# Patient Record
Sex: FEMALE | Race: WHITE | NOT HISPANIC OR LATINO | Employment: FULL TIME | URBAN - METROPOLITAN AREA
[De-identification: names, ages, dates, MRNs, and addresses within clinical notes are randomized per-mention and may not be internally consistent; named-entity substitution may affect disease eponyms.]

---

## 2020-02-04 ENCOUNTER — HOSPITAL ENCOUNTER (OUTPATIENT)
Dept: OTHER | Facility: HOSPITAL | Age: 20
Discharge: HOME | End: 2020-02-04
Attending: NURSE PRACTITIONER

## 2020-02-04 ENCOUNTER — OFFICE VISIT CONVERTED (OUTPATIENT)
Dept: FAMILY MEDICINE CLINIC | Age: 20
End: 2020-02-04
Attending: NURSE PRACTITIONER

## 2020-02-04 LAB
ALBUMIN SERPL-MCNC: 4.8 G/DL (ref 3.5–5)
ALBUMIN/GLOB SERPL: 1.8 {RATIO} (ref 1.4–2.6)
ALP SERPL-CCNC: 85 U/L (ref 42–98)
ALT SERPL-CCNC: 19 U/L (ref 10–40)
ANION GAP SERPL CALC-SCNC: 19 MMOL/L (ref 8–19)
AST SERPL-CCNC: 15 U/L (ref 15–50)
BILIRUB SERPL-MCNC: 0.62 MG/DL (ref 0.2–1.3)
BUN SERPL-MCNC: 12 MG/DL (ref 5–25)
BUN/CREAT SERPL: 16 {RATIO} (ref 6–20)
CALCIUM SERPL-MCNC: 9.8 MG/DL (ref 8.7–10.4)
CHLORIDE SERPL-SCNC: 104 MMOL/L (ref 99–111)
CONV CO2: 20 MMOL/L (ref 22–32)
CONV TOTAL PROTEIN: 7.4 G/DL (ref 6.3–8.2)
CREAT UR-MCNC: 0.74 MG/DL (ref 0.5–0.9)
GFR SERPLBLD BASED ON 1.73 SQ M-ARVRAT: >60 ML/MIN/{1.73_M2}
GLOBULIN UR ELPH-MCNC: 2.6 G/DL (ref 2–3.5)
GLUCOSE SERPL-MCNC: 92 MG/DL (ref 65–99)
OSMOLALITY SERPL CALC.SUM OF ELEC: 287 MOSM/KG (ref 273–304)
POTASSIUM SERPL-SCNC: 4 MMOL/L (ref 3.5–5.3)
SODIUM SERPL-SCNC: 139 MMOL/L (ref 135–147)
TSH SERPL-ACNC: 0.72 M[IU]/L (ref 0.27–4.2)

## 2020-02-07 LAB
CONV CELIAC DISEASE AB-IGA: 92 MG/DL (ref 68–408)
TTG IGA SER-ACNC: 0 U/ML (ref 0–3)

## 2020-03-04 ENCOUNTER — HOSPITAL ENCOUNTER (OUTPATIENT)
Dept: OTHER | Facility: HOSPITAL | Age: 20
Discharge: HOME | End: 2020-03-04
Attending: NURSE PRACTITIONER

## 2020-03-04 ENCOUNTER — OFFICE VISIT CONVERTED (OUTPATIENT)
Dept: FAMILY MEDICINE CLINIC | Age: 20
End: 2020-03-04
Attending: NURSE PRACTITIONER

## 2020-03-04 LAB
CHOLEST SERPL-MCNC: 215 MG/DL (ref 107–200)
CHOLEST/HDLC SERPL: 3.6 {RATIO} (ref 3–6)
HDLC SERPL-MCNC: 59 MG/DL (ref 40–60)
LDLC SERPL CALC-MCNC: 138 MG/DL (ref 70–100)
TRIGL SERPL-MCNC: 90 MG/DL (ref 40–150)
VLDLC SERPL-MCNC: 18 MG/DL (ref 5–37)

## 2020-09-30 ENCOUNTER — OFFICE VISIT CONVERTED (OUTPATIENT)
Dept: FAMILY MEDICINE CLINIC | Age: 20
End: 2020-09-30
Attending: NURSE PRACTITIONER

## 2021-04-14 ENCOUNTER — OFFICE VISIT CONVERTED (OUTPATIENT)
Dept: FAMILY MEDICINE CLINIC | Age: 21
End: 2021-04-14
Attending: NURSE PRACTITIONER

## 2021-05-18 NOTE — PROGRESS NOTES
Chon Lopez  2000     Office/Outpatient Visit    Visit Date: Wed, Mar 4, 2020 02:03 pm    Provider: Kathy Topete N.P. (Assistant: Homa Mccormack MA)    Location: [facility]        Electronically signed by Kathy Topete N.P. on  03/05/2020 12:46:02 PM                             Subjective:        CC: Ms. Lopez is a 19 year old White female.  This is a follow-up visit.  and needs physical exam        HPI:           Patient presents with anxiety disorder, unspecified.  about 25 % improved on sertraline and sleeping better.  denies suicidal ideation or side effects of medication.  would like to continue sertraline and try an increased dose.            Additionally, she presents with history of encounter for general adult medical examination without abnormal findings.  she cannot recall when she last had a physical exam.  She is not currently using any form of contraception.   Her last Pap smear was 2 years ago and was normal.   She has never had a mammogram. She's had vision screening done 1 year ago and this was abnormal, but is corrected with glasses.   Preventative Health updated today.  She is not current with her Td and influenza immunization.  Ms. Lopez denies any history of abnormal Pap smears.      ROS:     CONSTITUTIONAL:  Negative for chills, fatigue, fever, and weight change.      CARDIOVASCULAR:  Negative for chest pain, palpitations, tachycardia, orthopnea, and edema.      RESPIRATORY:  Negative for cough, dyspnea, and hemoptysis.      GASTROINTESTINAL:  Positive for constipation ( improved ) and diarrhea ( improved on sertraline ).   Negative for abdominal pain, acid reflux symptoms, hematochezia, nausea or vomiting.      MUSCULOSKELETAL:  Negative for arthralgias, back pain, and myalgias.      NEUROLOGICAL:  Negative for dizziness, headaches, paresthesias, and weakness.      PSYCHIATRIC:  Positive for anxiety ( (improved) ) and insomnia; improved.   Negative for mood  swings or suicidal thoughts.          Past Medical History / Family History / Social History:         Last Reviewed on 2/04/2020 03:09 PM by Kathy Topete    Surgical History:         Positive for    wisdom tooth removal;;         Family History:         Positive for Hyperlipidemia ( mat. GF ).      Positive for Breast Cancer ( pat. GM ).      Positive for Type 2 Diabetes ( pat. GF ).      Positive for Anxiety ( mother ) and ADD mother and sister.          Social History:     Occupation:      Marital Status: Single     Children: None         Tobacco/Alcohol/Supplements:     Last Reviewed on 2/04/2020 03:04 PM by Homa Mccormack    Tobacco: She has a past history of cigarette smoking; quit date:  2018.          Current Problems:     Last Reviewed on 2/04/2020 03:09 PM by Kathy Topete    Anxiety disorder, unspecified    Diarrhea, unspecified    Encounter for screening for depression        Immunizations:     DTaP 1998    DTaP 1998    DTaP 1998    DTaP 9/9/1999    DTaP 6/7/2002    PedvaxHIB (Hib PRP-OMP) 1998    PedvaxHIB (Hib PRP-OMP) 1998    PedvaxHIB (Hib PRP-OMP) 1998    PedvaxHIB (Hib PRP-OMP) 9/9/1999    Hep B (pedi/adol, 3-dose schedule) 1998    Hep B (pedi/adol, 3-dose schedule) 1998    Hep B (pedi/adol, 3-dose schedule) 1998    IPV  Poliovirus, inactivated 1998    IPV  Poliovirus, inactivated 1998    IPV  Poliovirus, inactivated 6/15/1999    IPV  Poliovirus, inactivated 6/7/2002    MMR  (Measles-Mumps-Rubella), live 6/15/1999    MMR  (Measles-Mumps-Rubella), live 6/7/2002    Varicella, live 12/8/1999    Prevnar (Pneumococcal PCV 7) 6/19/2000        Allergies:     Last Reviewed on 3/04/2020 02:03 PM by Homa Mccormack    hydrocodone:          Current Medications:     Last Reviewed on 3/04/2020 02:03 PM by Homa Mccormack    No Known Medications.    sertraline 50 mg oral tablet [take 1/2 tablet daily at bedtime x 1 week then increase to 1 tab daily  at bedtime]        Objective:        Vitals:         Historical:     2/4/2020  BP:   130/76 mm Hg ( (right arm, , sitting, );) 2/4/2020  Wt:   141.4lbs    Current: 3/4/2020 2:07:39 PM    Ht:  5 ft, 1 in;  Wt: 137 lbs;  BMI: 25.9T: 99.9 F (oral);  BP: 135/90 mm Hg (right arm, sitting);  P: 94 bpm (right arm (BP Cuff), sitting);  sCr: 0.74 mg/dL;  GFR: 109.91        Repeat:     2:8:18 PM  BP:   122/77mm Hg (right arm, sitting, P-87)     Exams:     PHYSICAL EXAM:     GENERAL:  well developed and nourished; appropriately groomed; in no apparent distress;     EYES: PERRL, EOMI     E/N/T: EARS: bilateral TMs are normal;  NOSE: normal nasal mucosa; OROPHARYNX: posterior pharynx, including tonsils, tongue, and uvula are normal;     NECK: thyroid is non-palpable;     RESPIRATORY: normal respiratory rate and pattern with no distress; normal breath sounds with no rales, rhonchi, wheezes or rubs;     CARDIOVASCULAR: normal rate; rhythm is regular;     GASTROINTESTINAL: nontender; normal bowel sounds; no organomegaly;     LYMPHATIC: no enlargement of cervical or facial nodes;     MUSCULOSKELETAL:  Normal range of motion, strength and tone;     NEUROLOGIC: mental status: alert and oriented x 3; GROSSLY INTACT     PSYCHIATRIC:  appropriate affect and demeanor; normal speech pattern; grossly normal memory;         Assessment:         F41.9   Anxiety disorder, unspecified       Z13.220   Encounter for screening for lipoid disorders       Z00.00   Encounter for general adult medical examination without abnormal findings           ORDERS:         Meds Prescribed:       [Refilled] sertraline 50 mg oral tablet [take 1 and 1/2 tablet daily at bedtime], #45 (forty five) tablets, Refills: 5 (five)         Lab Orders:       [id number]  [facility] Lipid Panel  (Send-Out)                      Plan:         Anxiety disorder, unspecified        RECOMMENDATIONS given include: avoidance of caffeine, stress reduction, and advise counseling.  increase  dose.  follow up if not improving..            Prescriptions:       [Refilled] sertraline 50 mg oral tablet [take 1 and 1/2 tablet daily at bedtime], #45 (forty five) tablets, Refills: 5 (five)         Encounter for screening for lipoid disorders    LABORATORY:  Labs ordered to be performed today include lipid panel.            Orders:       [id number]  [facility] Lipid Panel  (Send-Out)              Encounter for general adult medical examination without abnormal findings        COUNSELING was provided today regarding the following topics: healthy eating habits, regular exercise, breast self-exam, contraception, and STD prevention.            Patient Education Handouts:       Physical Exam 20-29 year, Female              Patient Recommendations:        For  Anxiety disorder, unspecified:    Try to avoid or reduce the amount of caffeine intake. Try stress reduction methods to reduce the frequency or lessen the severity of anxiety episodes.          For  Encounter for general adult medical examination without abnormal findings:        Limit dietary intake of fat (especially saturated fat) and cholesterol.  Eat a variety of foods, including plenty of fruits, vegetables, and grain containg fiber, limit fat intake to 30% of total calories. Balance caloric intake with energy expended.    Maintaining regular physical activity is advised to help prevent heart disease, hypertension, diabetes, and obesity.    You should regularly examine your breasts, easily done while in the shower or with lotion.  Feel and look for differences in consistency from month to month, especially noting knots or lumps, changes in skin appearance, nipple retraction or discharge.    Sexually transmitted diseases may be prevented by abstaining from sexual activity or avoiding sexual contact with high risk partners, and consistently using a condom or female barrier contraceptives plus spermacide.              Charge Capture:         Primary Diagnosis:      F41.9  Anxiety disorder, unspecified     Z13.220  Encounter for screening for lipoid disorders     Z00.00  Encounter for general adult medical examination without abnormal findings           Orders:      [id number]  Preventive medicine, established patient, age 18-39 years  (In-House)

## 2021-05-18 NOTE — PROGRESS NOTES
Chon Lopez  2000     Office/Outpatient Visit    Visit Date: Wed, Sep 30, 2020 12:43 pm    Provider: Kathy Topete N.P. (Assistant: Crystal Hilario, RN)    Location: [facility]        Electronically signed by Kathy Topete N.P. on  10/01/2020 08:50:51 PM                             Subjective:        CC: Ms. Lopez is a 20 year old White female.  medication refills;         HPI:           PHQ-9 Depression Screening: Completed form scanned and in chart; Total Score 17           Additionally, she presents with history of anxiety disorder, unspecified.  only about 25 % improved on sertraline.  has not scheduled cousneling yet.  good support system with sister and friend in [place].  strained relationship with parents.  likes new job ([employer]) and denies diarrhea or other stomach issues currently .  lmp 3 wks ago.  not sexually active.  has never had pap testing.      ROS:     CONSTITUTIONAL:  Negative for chills, fatigue, fever, and weight change.      CARDIOVASCULAR:  Negative for chest pain, palpitations, tachycardia, orthopnea, and edema.      RESPIRATORY:  Negative for cough, dyspnea, and hemoptysis.      GASTROINTESTINAL:  Negative for abdominal pain, heartburn, constipation, diarrhea, and stool changes.      MUSCULOSKELETAL:  Negative for arthralgias, back pain, and myalgias.      NEUROLOGICAL:  Negative for dizziness, headaches, paresthesias, and weakness.      PSYCHIATRIC:  Positive for anxiety and feelings of stress.   Negative for depression, mood swings, sleep disturbance or suicidal thoughts.          Past Medical History / Family History / Social History:         Last Reviewed on 9/30/2020 01:04 PM by Kathy Topete    Surgical History:         Positive for    wisdom tooth removal;;         Family History:         Positive for Hyperlipidemia ( mat. GF ).      Positive for Breast Cancer ( pat. GM ).      Positive for Type 2 Diabetes ( pat. GF ).      Positive for  Anxiety ( mother ) and ADD mother and sister.          Social History:     Occupation: [employer]     Marital Status: Single     Children: None         Tobacco/Alcohol/Supplements:     Last Reviewed on 9/30/2020 12:46 PM by Crystal Hilario    Tobacco: She has a past history of cigarette smoking; quit date:  2018.          Current Problems:     Last Reviewed on 2/04/2020 03:09 PM by Kathy Topete    Anxiety disorder, unspecified    Diarrhea, unspecified    Encounter for screening for depression    Encounter for general adult medical examination without abnormal findings    Encounter for screening for lipoid disorders        Immunizations:     DTaP 1998    DTaP 1998    DTaP 1998    DTaP 9/9/1999    DTaP 6/7/2002    PedvaxHIB (Hib PRP-OMP) 1998    PedvaxHIB (Hib PRP-OMP) 1998    PedvaxHIB (Hib PRP-OMP) 1998    PedvaxHIB (Hib PRP-OMP) 9/9/1999    Hep B (pedi/adol, 3-dose schedule) 1998    Hep B (pedi/adol, 3-dose schedule) 1998    Hep B (pedi/adol, 3-dose schedule) 1998    IPV  Poliovirus, inactivated 1998    IPV  Poliovirus, inactivated 1998    IPV  Poliovirus, inactivated 6/15/1999    IPV  Poliovirus, inactivated 6/7/2002    MMR  (Measles-Mumps-Rubella), live 6/15/1999    MMR  (Measles-Mumps-Rubella), live 6/7/2002    Varicella, live 12/8/1999    Prevnar (Pneumococcal PCV 7) 6/19/2000        Allergies:     Last Reviewed on 3/04/2020 02:03 PM by Homa Mccormack    hydrocodone:          Current Medications:     Last Reviewed on 9/30/2020 12:46 PM by Crystal Hilario    No Known Medications.    sertraline 50 mg oral tablet [TAKE ONE AND ONE-HALF (1 & 1/2) TABLET BY MOUTH EVERY NIGHT AT BEDTIME]        Objective:        Vitals:         Historical:     3/4/2020  BP:   122/77 mm Hg ( (right arm, , sitting, );) 3/4/2020  Wt:   137lbs    Current: 9/30/2020 12:48:57 PM    Ht:  5 ft, 1 in;  Wt: 135.2 lbs;  BMI: 25.5T: 97.9 F (temporal);  BP: 123/78 mm Hg (right arm, sitting);   P: 61 bpm (right arm (BP Cuff), sitting);  sCr: 0.74 mg/dL;  GFR: 108.40        Exams:     PHYSICAL EXAM:     GENERAL: anxious;     NECK: thyroid is non-palpable;     RESPIRATORY: normal respiratory rate and pattern with no distress; normal breath sounds with no rales, rhonchi, wheezes or rubs;     CARDIOVASCULAR: normal rate; rhythm is regular;  no edema;     MUSCULOSKELETAL:  Normal range of motion, strength and tone;     NEUROLOGIC: mental status: alert and oriented x 3; GROSSLY INTACT     PSYCHIATRIC:  appropriate affect and demeanor; normal speech pattern; grossly normal memory;         Assessment:         Z13.31   Encounter for screening for depression       F41.9   Anxiety disorder, unspecified           ORDERS:         Meds Prescribed:       [New Rx] escitalopram oxalate 10 mg oral tablet [take 1 tablet (10 mg) by oral route once daily], #30 (thirty) tablets, Refills: 5 (five)       [New Rx] busPIRone 5 mg oral tablet [take 1 tablet (5 mg) by oral route 2 times per day], #60 (sixty) tablets, Refills: 1 (one)         Other Orders:         Depression screen positive and follow up plan documented  (In-House)                      Plan:         Encounter for screening for depression    MIPS PHQ-9 Depression Screening: Completed form scanned and in chart; Total Score 17 Positive Depression Screen: Suicide Risk Assessment completed--denies suicidal/homicidal ideation; Pharmacologic intervention initiated/modified advise pap testing to screen for cervical cancer          Orders:         Depression screen positive and follow up plan documented  (In-House)              Anxiety disorder, unspecifiedstop sertraline ,  change to excitalopram. lj        RECOMMENDATIONS given include: avoidance of caffeine, stress reduction, and advise counseling.  list of local counselors provided..      denies symptoms of bipolar disorder.            Prescriptions:       [New Rx] escitalopram oxalate 10 mg oral tablet [take 1  tablet (10 mg) by oral route once daily], #30 (thirty) tablets, Refills: 5 (five)       [New Rx] busPIRone 5 mg oral tablet [take 1 tablet (5 mg) by oral route 2 times per day], #60 (sixty) tablets, Refills: 1 (one)           Patient Education Handouts:       Mental Health and Substance Abuse Services in [facility]              Patient Recommendations:        For  Anxiety disorder, unspecified:    Try to avoid or reduce the amount of caffeine intake. Try stress reduction methods to reduce the frequency or lessen the severity of anxiety episodes.              Charge Capture:         Primary Diagnosis:     Z13.31  Encounter for screening for depression           Orders:      [id number]  Office/outpatient visit; established patient, level 3  (In-House)              Depression screen positive and follow up plan documented  (In-House)              F41.9  Anxiety disorder, unspecified

## 2021-05-18 NOTE — PROGRESS NOTES
"Chon Lopez  2000     Office/Outpatient Visit    Visit Date: Tue, Feb 4, 2020 03:01 pm    Provider: Kathy Topete N.P. (Assistant: Homa Mccormack MA)    Location: [facility]        Electronically signed by Kathy Topete N.P. on  02/05/2020 03:56:12 PM                             Subjective:        CC: Ms. Lopez is a 19 year old female.  This is her first visit to the clinic.  establish care, \"every time i eat i have to go to the bathroom\";         HPI: lmp 3 wks ago          PHQ-9 Depression Screening: Completed form scanned and in chart; Total Score 20           Diarrhea, unspecified details; this has been present for the last year.  She estimates the stool frequency at 3 to 4 per day.  Stool volume is essentially unchanged from normal.  Stool is described as loose.  She denies associated symptoms, including fever, nausea, sensation of incomplete evacuation and vomiting.  Ms. Lopez denies recent travel outside of the country.  There has been no recent ingestion of antibiotics, shellfish, or undercooked hamburger.  She denies exposure to ill contacts.            In regard to the anxiety disorder, unspecified, she has suggestive symptoms but does not currently carry an official diagnosis of anxiety disorder.  Her symptom complex includes apprehension, insomnia, and depression.  True panic attacks apparently do not occur.  The frequency symptoms is several times per week.  Apparent triggers include disagreements with parents about career path.  She is not currently being treated for anxiety.  She has had no prior treatment for anxiety.  Medical history is pertinent for ADD.  Family history is pertinent for anxiety and ADD.      ROS:     CONSTITUTIONAL:  Positive for fatigue.   Negative for fever.      EYES:  Positive for use of glasses.      CARDIOVASCULAR:  Negative for chest pain, palpitations, tachycardia, orthopnea, and edema.      RESPIRATORY:  Negative for cough, dyspnea, and " hemoptysis.      GASTROINTESTINAL:  Positive for diarrhea.   Negative for abdominal pain, constipation, nausea or vomiting.      MUSCULOSKELETAL:  Negative for arthralgias, back pain, and myalgias.      NEUROLOGICAL:  Negative for dizziness, headaches, paresthesias, and weakness.      ENDOCRINE:  Negative for hair loss, heat/cold intolerance, polydipsia, and polyphagia.      PSYCHIATRIC:  Positive for anxiety, depression, feelings of stress, mood swings and insomnia.   Negative for crying spells, anhedonia, personality change, recreational drug use or suicidal thoughts.          Past Medical History / Family History / Social History:         Last Reviewed on 2/04/2020 03:09 PM by Kathy Topete    Surgical History:         Positive for    wisdom tooth removal;;         Family History:         Positive for Hyperlipidemia ( mat. GF ).      Positive for Breast Cancer ( pat. GM ).      Positive for Type 2 Diabetes ( pat. GF ).      Positive for Anxiety ( mother ) and ADD mother and sister.          Social History:     Occupation:      Marital Status: Single     Children: None         Tobacco/Alcohol/Supplements:     Last Reviewed on 2/04/2020 03:04 PM by Homa Mccormack    Tobacco: She has a past history of cigarette smoking; quit date:  2018.          Current Problems:     None Recorded        Immunizations:     DTaP 1998    DTaP 1998    DTaP 1998    DTaP 9/9/1999    DTaP 6/7/2002    PedvaxHIB (Hib PRP-OMP) 1998    PedvaxHIB (Hib PRP-OMP) 1998    PedvaxHIB (Hib PRP-OMP) 1998    PedvaxHIB (Hib PRP-OMP) 9/9/1999    Hep B (pedi/adol, 3-dose schedule) 1998    Hep B (pedi/adol, 3-dose schedule) 1998    Hep B (pedi/adol, 3-dose schedule) 1998    IPV  Poliovirus, inactivated 1998    IPV  Poliovirus, inactivated 1998    IPV  Poliovirus, inactivated 6/15/1999    IPV  Poliovirus, inactivated 6/7/2002    MMR  (Measles-Mumps-Rubella), live 6/15/1999    MMR   (Measles-Mumps-Rubella), live 6/7/2002    Varicella, live 12/8/1999    Prevnar (Pneumococcal PCV 7) 6/19/2000        Allergies:     No Known Allergies.        Current Medications:     Last Reviewed on 2/04/2020 03:04 PM by Homa Mccormack    No Known Medications.        Objective:        Vitals:         Current: 2/4/2020 3:06:49 PM    Ht:  5 ft, 1 in (Estimated);  Wt: 141.4 lbs;  BMI: 26.7T: 100 F (oral);  BP: 151/91 mm Hg (right arm, sitting);  P: 79 bpm (right arm (BP Cuff), sitting)        Repeat:     3:7:31 PM  BP:   130/76mm Hg (right arm, sitting, P-)     Exams:     PHYSICAL EXAM:     GENERAL: anxious;     NECK: thyroid is non-palpable;     RESPIRATORY: normal respiratory rate and pattern with no distress; normal breath sounds with no rales, rhonchi, wheezes or rubs;     CARDIOVASCULAR: normal rate; rhythm is regular;     GASTROINTESTINAL: nontender; normal bowel sounds; no organomegaly;     MUSCULOSKELETAL:  Normal range of motion, strength and tone;     NEUROLOGIC: mental status: alert and oriented x 3; GROSSLY INTACT     PSYCHIATRIC: affect/demeanor: anxious;  normal psychomotor function; normal speech pattern; normal thought and perception;         Assessment:         Z13.31   Encounter for screening for depression       R19.7   Diarrhea, unspecified       F41.9   Anxiety disorder, unspecified           ORDERS:         Meds Prescribed:       [New Rx] sertraline 50 mg oral tablet [take 1/2 tablet daily at bedtime x 1 week then increase to 1 tab daily at bedtime], #30 (thirty) tablets, Refills: 2 (two)         Lab Orders:       [id number]  CELID - [facility] - Celiac Antibodies Panel  (Send-Out)            [id number]  COMP - H Comp. Metabolic Panel  (Send-Out)            [id number]  TSH - [facility] TSH  (Send-Out)              Other Orders:         Depression screen positive and follow up plan documented  (In-House)                      Plan:         Encounter for screening for depression    MIPS PHQ-9 Depression Screening:  Completed form scanned and in chart; Total Score 20 Positive Depression Screen: Suicide Risk Assessment completed--denies suicidal/homicidal ideation; Pharmacologic intervention initiated/modified           Orders:         Depression screen positive and follow up plan documented  (In-House)              Diarrhea, unspecified    LABORATORY:  Labs ordered to be performed today include Celiac Antibody Panel, Comprehensive metabolic panel, and TSH.            Orders:       [id number]  [facility] - [facility] - Celiac Antibodies Panel  (Send-Out)            [id number]  COMP - [facility] Comp. Metabolic Panel  (Send-Out)            [id number]  TSH - [facility] TSH  (Send-Out)                Patient Education Handouts:       Irritable Bowel Syndrome (IBS)          Anxiety disorder, unspecified        RECOMMENDATIONS given include: avoidance of caffeine, stress reduction, and discuss trial of buspirone or sertraline.  she does feel that she has depression in addition to anxiety so will start with SSRI.  to Er if worsens.  follow up in one month or sooner if concerns..            Prescriptions:       [New Rx] sertraline 50 mg oral tablet [take 1/2 tablet daily at bedtime x 1 week then increase to 1 tab daily at bedtime], #30 (thirty) tablets, Refills: 2 (two)           Patient Education Handouts:       Bipolar Disorder (Manic Depression)        Generalized Anxiety Disorder        Mental Health and Substance Abuse Services in [facility]        Panic Attack              Patient Recommendations:        For  Anxiety disorder, unspecified:    Try to avoid or reduce the amount of caffeine intake. Try stress reduction methods to reduce the frequency or lessen the severity of anxiety episodes.              Charge Capture:         Primary Diagnosis:     Z13.31  Encounter for screening for depression           Orders:      [id number]  Office visit - new pt, level 3  (In-House)              Depression screen positive and follow up plan documented  (In-House)               R19.7  Diarrhea, unspecified     F41.9  Anxiety disorder, unspecified

## 2021-05-18 NOTE — PROGRESS NOTES
Chon Lopez  2000     Office/Outpatient Visit    Visit Date: Wed, Apr 14, 2021 02:46 pm    Provider: Kathy Topete N.P. (Assistant: Katarzyna Kothari, MA)    Location: [facility]        Electronically signed by Kathy Topete N.P. on  04/15/2021 08:24:20 PM                             Subjective:        CC: Ms. Lopez is a 20 year old White female.  Patient presents today for follow up visit;         HPI:           Patient to be evaluated for anxiety disorder, unspecified.  improved on escitalopram and buspirone.  denies side effects.  requests refills.  lmp 2 wks ago.  has not scheduled counseling yet.      ROS:     CONSTITUTIONAL:  Negative for chills, fatigue, fever, and weight change.      CARDIOVASCULAR:  Negative for chest pain, palpitations, tachycardia, orthopnea, and edema.      RESPIRATORY:  Negative for cough, dyspnea, and hemoptysis.      GENITOURINARY:  Negative for genital lesions, hematuria, menstrual problems, polyuria, abnormal vaginal bleeding, and vaginal discharge.      MUSCULOSKELETAL:  Negative for arthralgias, back pain, and myalgias.      NEUROLOGICAL:  Negative for dizziness, headaches, paresthesias, and weakness.      PSYCHIATRIC:  Positive for anxiety ( (improved) ).   Negative for mood swings, sleep disturbance or suicidal thoughts.          Past Medical History / Family History / Social History:         Last Reviewed on 4/14/2021 02:54 PM by Kathy Topete    Surgical History:         Positive for    wisdom tooth removal;;         Family History:         Positive for Hyperlipidemia ( mat. GF ).      Positive for Breast Cancer ( pat. GM ).      Positive for Type 2 Diabetes ( pat. GF ).      Positive for Anxiety ( mother ) and ADD mother and sister.          Social History:     Occupation: [employer]     Marital Status: Single     Children: None         Tobacco/Alcohol/Supplements:     Last Reviewed on 4/14/2021 02:49 PM by Katarzyna Kothari    Tobacco:  She has a past history of cigarette smoking; quit date:  2018.          Current Problems:     Last Reviewed on 4/15/2021 08:17 PM by Kathy Topete    Anxiety disorder, unspecified    Encounter for screening for depression        Immunizations:     DTaP 1998    DTaP 1998    DTaP 1998    DTaP 9/9/1999    DTaP 6/7/2002    PedvaxHIB (Hib PRP-OMP) 1998    PedvaxHIB (Hib PRP-OMP) 1998    PedvaxHIB (Hib PRP-OMP) 1998    PedvaxHIB (Hib PRP-OMP) 9/9/1999    Hep B (pedi/adol, 3-dose schedule) 1998    Hep B (pedi/adol, 3-dose schedule) 1998    Hep B (pedi/adol, 3-dose schedule) 1998    IPV  Poliovirus, inactivated 1998    IPV  Poliovirus, inactivated 1998    IPV  Poliovirus, inactivated 6/15/1999    IPV  Poliovirus, inactivated 6/7/2002    MMR  (Measles-Mumps-Rubella), live 6/15/1999    MMR  (Measles-Mumps-Rubella), live 6/7/2002    Varicella, live 12/8/1999    Prevnar (Pneumococcal PCV 7) 6/19/2000        Allergies:     Last Reviewed on 4/14/2021 02:49 PM by Katarzyna Kothari    hydrocodone:          Current Medications:     Last Reviewed on 4/14/2021 02:49 PM by Katarzyna Kothari    No Known Medications.    escitalopram oxalate 10 mg oral tablet [take 1 tablet (10 mg) by oral route once daily]    busPIRone 5 mg oral tablet [TAKE ONE TABLET BY MOUTH TWICE A DAY]        Objective:        Vitals:         Current: 4/14/2021 2:50:34 PM    Ht:  5 ft, 1 in;  Wt: 132.4 lbs;  BMI: 25.0T: 97.3 F (temporal);  BP: 117/70 mm Hg (right arm, sitting);  P: 62 bpm (right arm (BP Cuff), sitting);  sCr: 0.74 mg/dL;  GFR: 107.44        Exams:     PHYSICAL EXAM:     GENERAL:  well developed and nourished; appropriately groomed; in no apparent distress;     NECK: thyroid is non-palpable;     RESPIRATORY: normal respiratory rate and pattern with no distress; normal breath sounds with no rales, rhonchi, wheezes or rubs;     CARDIOVASCULAR: normal rate; rhythm is regular;     MUSCULOSKELETAL:   Normal range of motion, strength and tone;     NEUROLOGIC: mental status: alert and oriented x 3; GROSSLY INTACT     PSYCHIATRIC:  appropriate affect and demeanor; normal speech pattern; grossly normal memory;         Assessment:         F41.9   Anxiety disorder, unspecified           ORDERS:         Meds Prescribed:       [Refilled] escitalopram oxalate 10 mg oral tablet [take 1 tablet (10 mg) by oral route once daily], #90 (ninety) tablets, Refills: 1 (one)       [Refilled] busPIRone 5 mg oral tablet [TAKE ONE TABLET BY MOUTH TWICE A DAY prn], #60 (sixty) tablets, Refills: 5 (five)                 Plan:         Anxiety disorder, unspecified        RECOMMENDATIONS given include: avoidance of caffeine, stress reduction, and strongly encourage counseling ans it helps build coping skills .  follow up in 6 months or sooner if concerns..  MIPS Vaccines Flu and Pneumonia updated in Shot record           Prescriptions:       [Refilled] escitalopram oxalate 10 mg oral tablet [take 1 tablet (10 mg) by oral route once daily], #90 (ninety) tablets, Refills: 1 (one)       [Refilled] busPIRone 5 mg oral tablet [TAKE ONE TABLET BY MOUTH TWICE A DAY prn], #60 (sixty) tablets, Refills: 5 (five)             Patient Recommendations:        For  Anxiety disorder, unspecified:    Try to avoid or reduce the amount of caffeine intake. Try stress reduction methods to reduce the frequency or lessen the severity of anxiety episodes.              Charge Capture:         Primary Diagnosis:     F41.9  Anxiety disorder, unspecified           Orders:      49772  Office/outpatient visit; established patient, level 3  (In-House)

## 2021-07-02 VITALS
HEIGHT: 61 IN | BODY MASS INDEX: 26.7 KG/M2 | TEMPERATURE: 100 F | WEIGHT: 141.4 LBS | SYSTOLIC BLOOD PRESSURE: 130 MMHG | DIASTOLIC BLOOD PRESSURE: 76 MMHG | HEART RATE: 79 BPM

## 2021-07-02 VITALS
HEART RATE: 94 BPM | HEIGHT: 61 IN | BODY MASS INDEX: 25.86 KG/M2 | DIASTOLIC BLOOD PRESSURE: 77 MMHG | SYSTOLIC BLOOD PRESSURE: 122 MMHG | WEIGHT: 137 LBS | TEMPERATURE: 99.9 F

## 2021-07-02 VITALS
BODY MASS INDEX: 25.53 KG/M2 | HEART RATE: 61 BPM | TEMPERATURE: 97.9 F | HEIGHT: 61 IN | WEIGHT: 135.2 LBS | DIASTOLIC BLOOD PRESSURE: 78 MMHG | SYSTOLIC BLOOD PRESSURE: 123 MMHG

## 2021-07-02 VITALS
HEIGHT: 61 IN | DIASTOLIC BLOOD PRESSURE: 70 MMHG | HEART RATE: 62 BPM | BODY MASS INDEX: 25 KG/M2 | TEMPERATURE: 97.3 F | SYSTOLIC BLOOD PRESSURE: 117 MMHG | WEIGHT: 132.4 LBS

## 2021-12-17 RX ORDER — ESCITALOPRAM OXALATE 10 MG/1
TABLET ORAL
Qty: 30 TABLET | Refills: 0 | Status: SHIPPED | OUTPATIENT
Start: 2021-12-17 | End: 2021-12-21 | Stop reason: SDUPTHER

## 2021-12-21 ENCOUNTER — OFFICE VISIT (OUTPATIENT)
Dept: FAMILY MEDICINE CLINIC | Age: 21
End: 2021-12-21

## 2021-12-21 VITALS
HEIGHT: 61 IN | OXYGEN SATURATION: 98 % | DIASTOLIC BLOOD PRESSURE: 74 MMHG | SYSTOLIC BLOOD PRESSURE: 114 MMHG | HEART RATE: 70 BPM | WEIGHT: 136 LBS | BODY MASS INDEX: 25.68 KG/M2

## 2021-12-21 DIAGNOSIS — F41.9 ANXIETY: Primary | ICD-10-CM

## 2021-12-21 PROCEDURE — 99213 OFFICE O/P EST LOW 20 MIN: CPT | Performed by: NURSE PRACTITIONER

## 2021-12-21 RX ORDER — BUSPIRONE HYDROCHLORIDE 5 MG/1
5 TABLET ORAL 2 TIMES DAILY PRN
Qty: 60 TABLET | Refills: 2 | Status: SHIPPED | OUTPATIENT
Start: 2021-12-21 | End: 2021-12-28

## 2021-12-21 RX ORDER — BUSPIRONE HYDROCHLORIDE 5 MG/1
TABLET ORAL AS NEEDED
COMMUNITY
Start: 2021-11-02 | End: 2021-12-21 | Stop reason: SDUPTHER

## 2021-12-21 RX ORDER — ESCITALOPRAM OXALATE 10 MG/1
10 TABLET ORAL DAILY
Qty: 90 TABLET | Refills: 1 | Status: SHIPPED | OUTPATIENT
Start: 2021-12-21 | End: 2021-12-28 | Stop reason: DRUGHIGH

## 2021-12-21 NOTE — PROGRESS NOTES
"Chief Complaint  Anxiety (Med refill)    Subjective  Patient is a 21-year-old female here today for follow-up on anxiety.  Improved on Lexapro 10 mg once per day.  Has taken buspirone a couple of times for breakthrough anxiety with some relief.  Persists with concerns of decreased concentration and possible hyperactivity.  Does consume multiple servings of caffeine per day.  Reports not feeling tired at night and goes to bed late or feeling unable to turn her mind off at night to sleep.  Last menstrual cycle was approximately 3 weeks ago.  Mother and sister have any diagnosed with some form of ADD.  Previous lipid panel obtained had mild elevations but patient states she was nonfasting.  She states she will let me know when she wants to have her fasting lipid panel rechecked.        Chon Lopez presents to [facility]    Review of Systems   Constitutional: Negative.    Respiratory: Negative.    Cardiovascular: Negative.    Neurological: Negative.    Psychiatric/Behavioral:        Anxiety improved but persists with decreased concentration        Objective   Vital Signs:   Vitals:    12/21/21 0944   BP: 114/74   BP Location: Right arm   Patient Position: Sitting   Cuff Size: Adult   Pulse: 70   SpO2: 98%   Weight: 61.7 kg (136 lb)   Height: 154.9 cm (61\")      Physical Exam  Vitals reviewed.   Constitutional:       General: She is not in acute distress.     Appearance: Normal appearance. She is well-developed.   Cardiovascular:      Rate and Rhythm: Normal rate and regular rhythm.      Heart sounds: Normal heart sounds.   Pulmonary:      Effort: Pulmonary effort is normal.      Breath sounds: Normal breath sounds.   Musculoskeletal:      Right lower leg: No edema.      Left lower leg: No edema.   Skin:     General: Skin is warm and dry.   Neurological:      General: No focal deficit present.      Mental Status: She is alert.   Psychiatric:         Attention and Perception: " Attention normal.         Mood and Affect: Mood and affect normal.         Behavior: Behavior normal.          Result Review :                                Assessment and Plan    Diagnoses and all orders for this visit:    1. Anxiety (Primary)  Assessment & Plan:  Suggest ruling out ADD or ADHD.  I am not trying to make that determination.  Patient is agreeable to see a mental health specialist for evaluation.  Recommend decreasing intake of caffeine.    Orders:  -     Ambulatory Referral to Psychiatry  -     busPIRone (BUSPAR) 5 MG tablet; Take 1 tablet by mouth 2 (Two) Times a Day As Needed (anxiety).  Dispense: 60 tablet; Refill: 2  -     escitalopram (LEXAPRO) 10 MG tablet; Take 1 tablet by mouth Daily.  Dispense: 90 tablet; Refill: 1      Follow Up    Return in about 6 months (around 6/21/2022).  Patient was given instructions and counseling regarding her condition or for health maintenance advice. Please see specific information pulled into the AVS if appropriate.

## 2021-12-21 NOTE — ASSESSMENT & PLAN NOTE
Suggest ruling out ADD or ADHD.  I am not trying to make that determination.  Patient is agreeable to see a mental health specialist for evaluation.  Recommend decreasing intake of caffeine.

## 2021-12-28 ENCOUNTER — OFFICE VISIT (OUTPATIENT)
Dept: BEHAVIORAL HEALTH | Facility: CLINIC | Age: 21
End: 2021-12-28

## 2021-12-28 VITALS
BODY MASS INDEX: 25.68 KG/M2 | SYSTOLIC BLOOD PRESSURE: 116 MMHG | HEIGHT: 61 IN | DIASTOLIC BLOOD PRESSURE: 66 MMHG | WEIGHT: 136 LBS

## 2021-12-28 DIAGNOSIS — F33.1 MODERATE EPISODE OF RECURRENT MAJOR DEPRESSIVE DISORDER (HCC): Primary | ICD-10-CM

## 2021-12-28 DIAGNOSIS — R41.840 ATTENTION AND CONCENTRATION DEFICIT: ICD-10-CM

## 2021-12-28 DIAGNOSIS — F41.1 GENERALIZED ANXIETY DISORDER: ICD-10-CM

## 2021-12-28 PROCEDURE — 90792 PSYCH DIAG EVAL W/MED SRVCS: CPT | Performed by: NURSE PRACTITIONER

## 2021-12-28 RX ORDER — BUSPIRONE HYDROCHLORIDE 5 MG/1
5 TABLET ORAL 2 TIMES DAILY
Qty: 60 TABLET | Refills: 2
Start: 2021-12-28 | End: 2022-06-21 | Stop reason: SDUPTHER

## 2021-12-28 RX ORDER — ESCITALOPRAM OXALATE 20 MG/1
20 TABLET ORAL DAILY
Qty: 30 TABLET | Refills: 5 | Status: SHIPPED | OUTPATIENT
Start: 2021-12-28 | End: 2022-06-21 | Stop reason: SDUPTHER

## 2021-12-28 NOTE — PATIENT INSTRUCTIONS
1.  Please return to clinic at your next scheduled visit.  Contact the clinic ([phone]) at least 24 hours prior in the event you need to cancel.  2.  Do no harm to yourself or others.    3.  Avoid alcohol and drugs.    4.  Take all medications as prescribed.  Please contact the clinic with any concerns. If you are in need of medication refills, please call the clinic at [phone].    5. Should you want to get in touch with your provider, ANIKA Goetz, please utilize [employer] or contact the office ([phone]), and staff will be able to page the provider on call directly.  6.  In the event you have personal crisis, contact the following crisis numbers: Suicide Prevention Hotline 1-373.248.5003; NAMRATA Helpline 0-042-443-AXFZ; [facility] Emergency Room [phone]; text HELLO to 594992; or 905.     SPECIFIC RECOMMENDATIONS:     1.      Medications discussed at this encounter:                   - Increase Lexapro from 10 to 20 mg by mouth nightly per patient preference.  Change Buspar from as needed to 5mg by mouth twice daily routinely.     2.      Psychotherapy recommendations: referral made for therapy and adhd testing. Patient to contact provider and set up appointment.  And to contact office if unable to get an appointment scheduled.      3.     Return to clinic: 4 weeks    Therapy:  Irina Loredo LM  Address: [address] [address] [address] [place] [address], [phone]  Phone: [phone]  Other location: [address] [facility], [facility] [address]   Services offered: Family, couples, and individual therapy for a wide variety of areas, such as mood disorders, personality disorders, psychosis, addiction, anxiety, coping skills, grief, trauma and PTSD, transgender, self-harming, suicidal ideation, and other. Multiple types of therapy offered, including dialectical, trauma focused, and more.  Insurance  accepted: [place], [place], BlueCross and BlueShield, CareSource, Humana, Medicaid, WellCare, Out of Network.       ADHD testing sites:    Dr. Duane Logan, Psychologist, MS, [facility]  [address], [address]  [place] [id number] [phone]   Currently only accepting referrals for psychological testing services.  Accepts Most insurance plans except Medicare Plans    Neuropsychological Services    [facility]  Address: [address] [id number]  Phone: [phone]  Other location: [id number] [place] [address]. [phone]  Website: [email or web address]ological[facility]ic[facility]  Services offered: Testing and assessments for adult ADHD, intelligence/adaptive functioning, dementia and cognitive impairment. Also provides counseling and psychotherapy for anxiety, depression, trauma, grief, and more.  Insurance accepted: Aetna, [place] [facility], [facility], [employer], [employer], [employer] Health, Humana, Medicare, , United Healthcare/Optum Health, ValueOptions, Medicaid plans (Aetna Better Health, [place] Medicaid, Humana Medicaid, [place], Passport, Wellcare).    [facility]   Address: [address], [place] [address]  Phone: [phone]  Website: [email or web address]  Services offered: Testing and assessments for adult ADHD, intelligence/adaptive functioning, brain injury, dementia and cognitive impairment. Also provides psychotherapy to individuals, couples, and families for anxiety, depression, adjustment, loss and grief, stress management, emotional distress, and more.  Insurance accepted: Most major insurance companies, including [place], Humana, and Medicare. Not currently participating in United Health Care and do not accept Medicaid.     [facility]  Address: [address], [place] [address]  Phone: [phone]  Website:  [email or web address]  Services offered: Evaluation and testing for adults with emotional difficulties and/or ADHD, functional impairment of the brain in adults/seniors, and more. Also provides individual, group, family and marital therapy, comprehensive hypnosis services, career counseling, and neurobehavioral family therapy for brain-impaired individuals and their families.  Insurance accepted: Humana, [place], United Health Care, [employer] ([employer]), and most commercial insurance. No Medicaid or Medicare.    Gisell Steele with [facility]  Address: [address], [place] [facility] (office is rear entrance on [address] [facility]).  Phone: [phone]  Website: [email or web address]  Services offered: Psychological testing offered and also psychotherapy.   Insurance accepted: Accepts most insurance, including Medicare and Medicaid.    1. [facility]      [phone]     2.  [facility]       Doctoral Students perform ADHD testing and use Sliding Scale payments.         [phone]    3.  [place] [facility]       [phone]    4.  Next [facility] ADHD       [phone]       Web-site includes request appointment portal if unable to reach via phone.  Escitalopram tablets  What is this medicine?  ESCITALOPRAM (es sye EMANUEL oh pram) is used to treat depression and certain types of anxiety.  This medicine may be used for other purposes; ask your health care provider or pharmacist if you have questions.  COMMON BRAND NAME(S): Lexapro  What should I tell my health care provider before I take this medicine?  They need to know if you have any of these conditions:  · bipolar disorder or a family history of bipolar disorder  · diabetes  · glaucoma  · heart disease  · kidney or liver disease  · receiving electroconvulsive therapy  · seizures (convulsions)  · suicidal thoughts, plans, or attempt by you or a family member  · an unusual or allergic reaction to  escitalopram, the related drug citalopram, other medicines, foods, dyes, or preservatives  · pregnant or trying to become pregnant  · breast-feeding  How should I use this medicine?  Take this medicine by mouth with a glass of water. Follow the directions on the prescription label. You can take it with or without food. If it upsets your stomach, take it with food. Take your medicine at regular intervals. Do not take it more often than directed. Do not stop taking this medicine suddenly except upon the advice of your doctor. Stopping this medicine too quickly may cause serious side effects or your condition may worsen.  A special MedGuide will be given to you by the pharmacist with each prescription and refill. Be sure to read this information carefully each time.  Talk to your pediatrician regarding the use of this medicine in children. Special care may be needed.  Overdosage: If you think you have taken too much of this medicine contact a poison control center or emergency room at once.  NOTE: This medicine is only for you. Do not share this medicine with others.  What if I miss a dose?  If you miss a dose, take it as soon as you can. If it is almost time for your next dose, take only that dose. Do not take double or extra doses.  What may interact with this medicine?  Do not take this medicine with any of the following medications:  · certain medicines for fungal infections like fluconazole, itraconazole, ketoconazole, posaconazole, voriconazole  · cisapride  · citalopram  · dronedarone  · linezolid  · MAOIs like Carbex, Eldepryl, Marplan, Nardil, and Parnate  · methylene blue (injected into a vein)  · pimozide  · thioridazine  This medicine may also interact with the following medications:  · alcohol  · amphetamines  · aspirin and aspirin-like medicines  · carbamazepine  · certain medicines for depression, anxiety, or psychotic disturbances  · certain medicines for migraine headache like almotriptan, eletriptan,  frovatriptan, naratriptan, rizatriptan, sumatriptan, zolmitriptan  · certain medicines for sleep  · certain medicines that treat or prevent blood clots like warfarin, enoxaparin, dalteparin  · cimetidine  · diuretics  · dofetilide  · fentanyl  · furazolidone  · isoniazid  · lithium  · metoprolol  · NSAIDs, medicines for pain and inflammation, like ibuprofen or naproxen  · other medicines that prolong the QT interval (cause an abnormal heart rhythm)  · procarbazine  · rasagiline  · supplements like Yue's wort, kava kava, valerian  · tramadol  · tryptophan  · ziprasidone  This list may not describe all possible interactions. Give your health care provider a list of all the medicines, herbs, non-prescription drugs, or dietary supplements you use. Also tell them if you smoke, drink alcohol, or use illegal drugs. Some items may interact with your medicine.  What should I watch for while using this medicine?  Tell your doctor if your symptoms do not get better or if they get worse. Visit your doctor or health care professional for regular checks on your progress. Because it may take several weeks to see the full effects of this medicine, it is important to continue your treatment as prescribed by your doctor.  Patients and their families should watch out for new or worsening thoughts of suicide or depression. Also watch out for sudden changes in feelings such as feeling anxious, agitated, panicky, irritable, hostile, aggressive, impulsive, severely restless, overly excited and hyperactive, or not being able to sleep. If this happens, especially at the beginning of treatment or after a change in dose, call your health care professional.  You may get drowsy or dizzy. Do not drive, use machinery, or do anything that needs mental alertness until you know how this medicine affects you. Do not stand or sit up quickly, especially if you are an older patient. This reduces the risk of dizzy or fainting spells. Alcohol may  interfere with the effect of this medicine. Avoid alcoholic drinks.  Your mouth may get dry. Chewing sugarless gum or sucking hard candy, and drinking plenty of water may help. Contact your doctor if the problem does not go away or is severe.  What side effects may I notice from receiving this medicine?  Side effects that you should report to your doctor or health care professional as soon as possible:  · allergic reactions like skin rash, itching or hives, swelling of the face, lips, or tongue  · anxious  · black, tarry stools  · changes in vision  · confusion  · elevated mood, decreased need for sleep, racing thoughts, impulsive behavior  · eye pain  · fast, irregular heartbeat  · feeling faint or lightheaded, falls  · feeling agitated, angry, or irritable  · hallucination, loss of contact with reality  · loss of balance or coordination  · loss of memory  · painful or prolonged erections  · restlessness, pacing, inability to keep still  · seizures  · stiff muscles  · suicidal thoughts or other mood changes  · trouble sleeping  · unusual bleeding or bruising  · unusually weak or tired  · vomiting  Side effects that usually do not require medical attention (report to your doctor or health care professional if they continue or are bothersome):  · changes in appetite  · change in sex drive or performance  · headache  · increased sweating  · indigestion, nausea  · tremors  This list may not describe all possible side effects. Call your doctor for medical advice about side effects. You may report side effects to FDA at 5-849-FDA-4815.  Where should I keep my medicine?  Keep out of reach of children.  Store at room temperature between 15 and 30 degrees C (59 and 86 degrees F). Throw away any unused medicine after the expiration date.  NOTE: This sheet is a summary. It may not cover all possible information. If you have questions about this medicine, talk to your doctor, pharmacist, or health care provider.  © 2021  Elsevier/Gold Standard (2019-12-09 11:21:44)  Buspirone tablets  What is this medicine?  BUSPIRONE (byghazala victoria) is used to treat anxiety disorders.  This medicine may be used for other purposes; ask your health care provider or pharmacist if you have questions.  COMMON BRAND NAME(S): Samy  What should I tell my health care provider before I take this medicine?  They need to know if you have any of these conditions:  · kidney or liver disease  · an unusual or allergic reaction to buspirone, other medicines, foods, dyes, or preservatives  · pregnant or trying to get pregnant  · breast-feeding  How should I use this medicine?  Take this medicine by mouth with a glass of water. Follow the directions on the prescription label. You may take this medicine with or without food. To ensure that this medicine always works the same way for you, you should take it either always with or always without food. Take your doses at regular intervals. Do not take your medicine more often than directed. Do not stop taking except on the advice of your doctor or health care professional.  Talk to your pediatrician regarding the use of this medicine in children. Special care may be needed.  Overdosage: If you think you have taken too much of this medicine contact a poison control center or emergency room at once.  NOTE: This medicine is only for you. Do not share this medicine with others.  What if I miss a dose?  If you miss a dose, take it as soon as you can. If it is almost time for your next dose, take only that dose. Do not take double or extra doses.  What may interact with this medicine?  Do not take this medicine with any of the following medications:  · linezolid  · MAOIs like Carbex, Eldepryl, Marplan, Nardil, and Parnate  · methylene blue  · procarbazine  This medicine may also interact with the following medications:  · diazepam  · digoxin  · diltiazem  · erythromycin  · grapefruit juice  · haloperidol  · medicines for  mental depression or mood problems  · medicines for seizures like carbamazepine, phenobarbital and phenytoin  · nefazodone  · other medications for anxiety  · rifampin  · ritonavir  · some antifungal medicines like itraconazole, ketoconazole, and voriconazole  · verapamil  · warfarin  This list may not describe all possible interactions. Give your health care provider a list of all the medicines, herbs, non-prescription drugs, or dietary supplements you use. Also tell them if you smoke, drink alcohol, or use illegal drugs. Some items may interact with your medicine.  What should I watch for while using this medicine?  Visit your doctor or health care professional for regular checks on your progress. It may take 1 to 2 weeks before your anxiety gets better.  You may get drowsy or dizzy. Do not drive, use machinery, or do anything that needs mental alertness until you know how this drug affects you. Do not stand or sit up quickly, especially if you are an older patient. This reduces the risk of dizzy or fainting spells. Alcohol can make you more drowsy and dizzy. Avoid alcoholic drinks.  What side effects may I notice from receiving this medicine?  Side effects that you should report to your doctor or health care professional as soon as possible:  · blurred vision or other vision changes  · chest pain  · confusion  · difficulty breathing  · feelings of hostility or anger  · muscle aches and pains  · numbness or tingling in hands or feet  · ringing in the ears  · skin rash and itching  · vomiting  · weakness  Side effects that usually do not require medical attention (report to your doctor or health care professional if they continue or are bothersome):  · disturbed dreams, nightmares  · headache  · nausea  · restlessness or nervousness  · sore throat and nasal congestion  · stomach upset  This list may not describe all possible side effects. Call your doctor for medical advice about side effects. You may report side  "effects to FDA at 5-765-FDA-8071.  Where should I keep my medicine?  Keep out of the reach of children.  Store at room temperature below 30 degrees C (86 degrees F). Protect from light. Keep container tightly closed. Throw away any unused medicine after the expiration date.  NOTE: This sheet is a summary. It may not cover all possible information. If you have questions about this medicine, talk to your doctor, pharmacist, or health care provider.  © 2021 Elsevier/Gold Standard (2011-07-28 18:06:11)    http://[facility].NIH.Gov\">   Generalized Anxiety Disorder, Adult  Generalized anxiety disorder (KIMBERLEE) is a mental health condition. Unlike normal worries, anxiety related to KIMBERLEE is not triggered by a specific event. These worries do not fade or get better with time. KIMBERLEE interferes with relationships, work, and school.  KIMBERLEE symptoms can vary from mild to severe. People with severe KIMBERLEE can have intense waves of anxiety with physical symptoms that are similar to panic attacks.  What are the causes?  The exact cause of KIMBERLEE is not known, but the following are believed to have an impact:  · Differences in natural brain chemicals.  · Genes passed down from parents to children.  · Differences in the way threats are perceived.  · Development during childhood.  · Personality.  What increases the risk?  The following factors may make you more likely to develop this condition:  · Being female.  · Having a family history of anxiety disorders.  · Being very shy.  · Experiencing very stressful life events, such as the death of a loved one.  · Having a very stressful family environment.  What are the signs or symptoms?  People with KIMBERLEE often worry excessively about many things in their lives, such as their health and family. Symptoms may also include:  · Mental and emotional symptoms:  ? Worrying excessively about natural disasters.  ? Fear of being late.  ? Difficulty concentrating.  ? Fears that others are judging your " performance.  · Physical symptoms:  ? Fatigue.  ? Headaches, muscle tension, muscle twitches, trembling, or feeling shaky.  ? Feeling like your heart is pounding or beating very fast.  ? Feeling out of breath or like you cannot take a deep breath.  ? Having trouble falling asleep or staying asleep, or experiencing restlessness.  ? Sweating.  ? Nausea, diarrhea, or irritable bowel syndrome (IBS).  · Behavioral symptoms:  ? Experiencing erratic moods or irritability.  ? Avoidance of new situations.  ? Avoidance of people.  ? Extreme difficulty making decisions.  How is this diagnosed?  This condition is diagnosed based on your symptoms and medical history. You will also have a physical exam. Your health care provider may perform tests to rule out other possible causes of your symptoms.  To be diagnosed with KIMBERLEE, a person must have anxiety that:  · Is out of his or her control.  · Affects several different aspects of his or her life, such as work and relationships.  · Causes distress that makes him or her unable to take part in normal activities.  · Includes at least three symptoms of KIMBERLEE, such as restlessness, fatigue, trouble concentrating, irritability, muscle tension, or sleep problems.  Before your health care provider can confirm a diagnosis of KIMBERLEE, these symptoms must be present more days than they are not, and they must last for 6 months or longer.  How is this treated?  This condition may be treated with:  · Medicine. Antidepressant medicine is usually prescribed for long-term daily control. Anti-anxiety medicines may be added in severe cases, especially when panic attacks occur.  · Talk therapy (psychotherapy). Certain types of talk therapy can be helpful in treating KIMBERLEE by providing support, education, and guidance. Options include:  ? Cognitive behavioral therapy (CBT). People learn coping skills and self-calming techniques to ease their physical symptoms. They learn to identify unrealistic thoughts and  behaviors and to replace them with more appropriate thoughts and behaviors.  ? Acceptance and commitment therapy (ACT). This treatment teaches people how to be mindful as a way to cope with unwanted thoughts and feelings.  ? Biofeedback. This process trains you to manage your body's response (physiological response) through breathing techniques and relaxation methods. You will work with a therapist while machines are used to monitor your physical symptoms.  · Stress management techniques. These include yoga, meditation, and exercise.  A mental health specialist can help determine which treatment is best for you. Some people see improvement with one type of therapy. However, other people require a combination of therapies.  Follow these instructions at home:  Lifestyle  · Maintain a consistent routine and schedule.  · Anticipate stressful situations. Create a plan, and allow extra time to work with your plan.  · Practice stress management or self-calming techniques that you have learned from your therapist or your health care provider.  General instructions  · Take over-the-counter and prescription medicines only as told by your health care provider.  · Understand that you are likely to have setbacks. Accept this and be kind to yourself as you persist to take better care of yourself.  · Recognize and accept your accomplishments, even if you  them as small.  · Keep all follow-up visits as told by your health care provider. This is important.  Contact a health care provider if:  · Your symptoms do not get better.  · Your symptoms get worse.  · You have signs of depression, such as:  ? A persistently sad or irritable mood.  ? Loss of enjoyment in activities that used to bring you glenys.  ? Change in weight or eating.  ? Changes in sleeping habits.  ? Avoiding friends or family members.  ? Loss of energy for normal tasks.  ? Feelings of guilt or worthlessness.  Get help right away if:  · You have serious thoughts  "about hurting yourself or others.  If you ever feel like you may hurt yourself or others, or have thoughts about taking your own life, get help right away. Go to your nearest emergency department or:  · Call your local emergency services (282 in the [place]  · Call a suicide crisis helpline, such as the National Suicide Prevention Lifeline at 1-567.872.2379. This is open 24 hours a day in the [place]  · Text the Crisis Text Line at 103600 (in the [place]  Summary  · Generalized anxiety disorder (KIMBERLEE) is a mental health condition that involves worry that is not triggered by a specific event.  · People with KIMBERLEE often worry excessively about many things in their lives, such as their health and family.  · KIMBERLEE may cause symptoms such as restlessness, trouble concentrating, sleep problems, frequent sweating, nausea, diarrhea, headaches, and trembling or muscle twitching.  · A mental health specialist can help determine which treatment is best for you. Some people see improvement with one type of therapy. However, other people require a combination of therapies.  This information is not intended to replace advice given to you by your health care provider. Make sure you discuss any questions you have with your health care provider.  Document Revised: 10/07/2020 Document Reviewed: 10/07/2020  [employer] Patient Education © 2021 Elsevier Inc.    http://APA.org/depression-guideline\"> https://[employer]\"> http://point-of-care.[employer] Services/skills/\"> http://point-of-care.[employer].com\">   Managing Depression, Adult  Depression is a mental health condition that affects your thoughts, feelings, and actions. Being diagnosed with depression can bring you relief if you did not know why you have felt or behaved a certain way. It could also leave you feeling overwhelmed with uncertainty about your future. Preparing yourself to manage your symptoms can help you feel more positive about your future.  How " to manage lifestyle changes  Managing stress    Stress is your body's reaction to life changes and events, both good and bad. Stress can add to your feelings of depression. Learning to manage your stress can help lessen your feelings of depression.  Try some of the following approaches to reducing your stress (stress reduction techniques):  · Listen to music that you enjoy and that inspires you.  · Try using a meditation delores or take a meditation class.  · Develop a practice that helps you connect with your spiritual self. Walk in nature, pray, or go to a place of [place].  · Do some deep breathing. To do this, inhale slowly through your nose. Pause at the top of your inhale for a few seconds and then exhale slowly, letting your muscles relax.  · Practice yoga to help relax and work your muscles.  Choose a stress reduction technique that suits your lifestyle and personality. These techniques take time and practice to develop. Set aside 5-15 minutes a day to do them. Therapists can offer training in these techniques. Other things you can do to manage stress include:  · Keeping a stress diary.  · Knowing your limits and saying no when you think something is too much.  · Paying attention to how you react to certain situations. You may not be able to control everything, but you can change your reaction.  · Adding humor to your life by watching funny films or TV shows.  · Making time for activities that you enjoy and that relax you.    Medicines  Medicines, such as antidepressants, are often a part of treatment for depression.  · Talk with your pharmacist or health care provider about all the medicines, supplements, and herbal products that you take, their possible side effects, and what medicines and other products are safe to take together.  · Make sure to report any side effects you may have to your health care provider.  Relationships  Your health care provider may suggest family therapy, couples therapy, or  individual therapy as part of your treatment.  How to recognize changes  Everyone responds differently to treatment for depression. As you recover from depression, you may start to:  · Have more interest in doing activities.  · Feel less hopeless.  · Have more energy.  · Overeat less often, or have a better appetite.  · Have better mental focus.  It is important to recognize if your depression is not getting better or is getting worse. The symptoms you had in the beginning may return, such as:  · Tiredness (fatigue) or low energy.  · Eating too much or too little.  · Sleeping too much or too little.  · Feeling restless, agitated, or hopeless.  · Trouble focusing or making decisions.  · Unexplained physical complaints.  · Feeling irritable, angry, or aggressive.  If you or your family members notice these symptoms coming back, let your health care provider know right away.  Follow these instructions at home:  Activity    · Try to get some form of exercise each day, such as walking, biking, swimming, or lifting weights.  · Practice stress reduction techniques.  · Engage your mind by taking a class or doing some volunteer work.    Lifestyle  · Get the right amount and quality of sleep.  · Cut down on using caffeine, tobacco, alcohol, and other potentially harmful substances.  · Eat a healthy diet that includes plenty of vegetables, fruits, whole grains, low-fat dairy products, and lean protein. Do not eat a lot of foods that are high in solid fats, added sugars, or salt (sodium).  General instructions  · Take over-the-counter and prescription medicines only as told by your health care provider.  · Keep all follow-up visits as told by your health care provider. This is important.  Where to find support  Talking to others    Friends and family members can be sources of support and guidance. Talk to trusted friends or family members about your condition. Explain your symptoms to them, and let them know that you are  working with a health care provider to treat your depression. Tell friends and family members how they also can be helpful.  Finances  · Find appropriate mental health providers that fit with your financial situation.  · Talk with your health care provider about options to get reduced prices on your medicines.  Where to find more information  You can find support in your area from:  · Anxiety and Depression Association of [place] (ADAA): www.adaa.org  · Mental Health [place]: www.mentalhealthamerica.net  · National [place] on Mental Illness: www.constantine.org  Contact a health care provider if:  · You stop taking your antidepressant medicines, and you have any of these symptoms:  ? Nausea.  ? Headache.  ? Light-headedness.  ? Chills and body aches.  ? Not being able to sleep (insomnia).  · You or your friends and family think your depression is getting worse.  Get help right away if:  · You have thoughts of hurting yourself or others.  If you ever feel like you may hurt yourself or others, or have thoughts about taking your own life, get help right away. Go to your nearest emergency department or:  · Call your local emergency services (301 in the [place]  · Call a suicide crisis helpline, such as the National Suicide Prevention Lifeline at 1-779.350.3623. This is open 24 hours a day in the [place]  · Text the Crisis Text Line at 789050 (in the [place]  Summary  · If you are diagnosed with depression, preparing yourself to manage your symptoms is a good way to feel positive about your future.  · Work with your health care provider on a management plan that includes stress reduction techniques, medicines (if applicable), therapy, and healthy lifestyle habits.  · Keep talking with your health care provider about how your treatment is working.  · If you have thoughts about taking your own life, call a suicide crisis helpline or text a crisis text line.  This information is not intended to replace advice given to you by your  health care provider. Make sure you discuss any questions you have with your health care provider.  Document Revised: 10/28/2020 Document Reviewed: 10/28/2020  Elsevier Patient Education © 2021 Elsevier Inc.

## 2021-12-28 NOTE — PROGRESS NOTES
"Subjective   Chon Lopez is a 21 y.o. female who presents today for initial evaluation     Referring Provider:  Kathy Topete, APRN  [address] [facility]  [address]  [place] [address]    Chief Complaint:  anxiety    History of Present Illness:  Patient presents to office with history of anxiety and depression beginning treatment 2/2020.  History of self harm behavior by cutting thighs with razor blade in 8 th & 9 th grade, denies current behavior as last occurred in 9th grade.   Reports not having many friends growing up, attended [employer] from 1st thru 8th grade.  Describes environment as clique and rich families. Upon starting high school did not know anyone as only a few students from [employer] attended new school.   Currently takes Buspar as needed which is once every few weeks, denies side effects of dizziness, sedation, or grogginess.     Appetite has always been bad, as patient explains eating 1-2 times per day and some snacking. Able to maintain weight without drastic changes.  Admits to problem with weight gain, as teenager would eat once a day, \"starved myself\", denies self induced vomiting, excessive exercise or binge eating.     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 Sleeps soundly without night time awakenings.  Very rare due to having to use the bathroom.  Admits to daytime fatigue, denies naps, low energy, working at  not doing any extensive hard work to make self tired.     Admits to overly concentrating on one thing, then gives up, explains as intermittent, often has to force self to do things.  Will give one thing priority over other things, if not finished at that time will completely forget about it at a later time.  Admits to losing items frequently, keeps keys in one place in purse and does not touch as car is self starter and keeps on lanyard.  " "At times will have bursts of energy as\" the last straw in regards to cleaning room\".  Growing up in school did have difficulty with math, in English would have vocabulary sheet to help with spelling as patient had difficulty with spelling, would do testing with a learning counselor to prevent distraction from other students.  In fourth &  fifth grade had to do reading with a group of 5 students, then in middle school started having a separate area for testing.     Sister and mother both have diagnosis of ADD, sister was diagnosed in middle school and was treated with medications which caused \"zombie\" like behavior.  Believes mother is no longer on medications and sister is not taking any medications for ADD either.  Patient denies testing for ADHD.     Admits to someday's are more productive, occasional experiences of distractibility, and forgets what task was initially intended.  Denies keeping a list or planner, has tried to use a planner and wants to use more.  ADHD 6 question screener with varied answers of often to sometimes to very often.  Most impairing symptoms are depression and anxiety today.        PHQ-9 Depression Screening  PHQ-9 Total Score: 14    Little interest or pleasure in doing things? 1   Feeling down, depressed, or hopeless? 2   Trouble falling or staying asleep, or sleeping too much?     Feeling tired or having little energy? 1   Poor appetite or overeating? 3   Feeling bad about yourself - or that you are a failure or have let yourself or your family down? 2   Trouble concentrating on things, such as reading the newspaper or watching television? 3   Moving or speaking so slowly that other people could have noticed? Or the opposite - being so fidgety or restless that you have been moving around a lot more than usual? 1   Thoughts that you would be better off dead, or of hurting yourself in some way? 1   PHQ-9 Total Score 14     KIMBERLEE-7  Feeling nervous, anxious or on edge: Nearly every day  Not " being able to stop or control worrying: More than half the days  Worrying too much about different things: More than half the days  Trouble Relaxing: Several days  Being so restless that it is hard to sit still: More than half the days  Feeling afraid as if something awful might happen: Several days  Becoming easily annoyed or irritable: Nearly every day  KIMBERLEE 7 Total Score: 14  If you checked any problems, how difficult have these problems made it for you to do your work, take care of things at home, or get along with other people: Somewhat difficult    Past Surgical History:  Past Surgical History:   Procedure Laterality Date   • LASIK     • WISDOM TOOTH EXTRACTION         Problem List:  Patient Active Problem List   Diagnosis   • Anxiety       Allergy:   Allergies   Allergen Reactions   • Eggs Or Egg-Derived Products GI Intolerance   • Hydrocodone Rash and Headache        Discontinued Medications:  Medications Discontinued During This Encounter   Medication Reason   • busPIRone (BUSPAR) 5 MG tablet Other- See Medication Note   • escitalopram (LEXAPRO) 10 MG tablet Dose adjustment       Current Medications:   Current Outpatient Medications   Medication Sig Dispense Refill   • busPIRone (BUSPAR) 5 MG tablet Take 1 tablet by mouth 2 (Two) Times a Day. Indications: Anxiety Disorder 60 tablet 2   • escitalopram (LEXAPRO) 20 MG tablet Take 1 tablet by mouth Daily for 180 days. Indications: Generalized Anxiety Disorder, Major Depressive Disorder 30 tablet 5     No current facility-administered medications for this visit.       Past Medical History:  History reviewed. No pertinent past medical history.    Past Psychiatric History:  Began Treatment:2/2020  Diagnoses:Depression and Anxiety  Psychiatrist:[facility]  Therapist:middle school age only  Admission History:Denies  Medication Trials:Zoloft 2/2020-9/2020 somewhat effective only 25%  Self Harm: middle school- cut thighs with razor blade, 8th grade & 9th grade, would also  pull out hair  Suicide Attempts:Denies   Psychosis, Anxiety, Depression: Denies    Substance Abuse History:   Types:Denies all, including illicit  Withdrawal Symptoms:Denies  Longest Period Sober:Not Applicable   AA: Not applicable     Social History:  Martial Status:Single  Employed:Yes and If so, where [employer]-  2nd shift  Kids:No  House:Lives in a house with parents and sister   History: [employer] briefly kicked out of [employer]  Access to Guns:  no    Social History     Socioeconomic History   • Marital status: Single   Tobacco Use   • Smoking status: Never Smoker   • Smokeless tobacco: Never Used   Vaping Use   • Vaping Use: Never used   Substance and Sexual Activity   • Alcohol use: Yes     Comment: 2-3 glasses of wine every 2-3 weeks   • Drug use: Never   • Sexual activity: Defer       Family History:   Suicide Attempts: Denies  Suicide Completions:Denies      Family History   Problem Relation Age of Onset   • Hyperlipidemia Maternal Grandfather    • Breast cancer Paternal Grandmother    • Diabetes Paternal Grandfather    • Anxiety disorder Mother    • ADD / ADHD Mother    • ADD / ADHD Sister        Developmental History:   Born: [place]  Siblings:1 sister younger  Childhood: Denies Abuse  High School:Completed  College:some college, [employer] then [employer] education, political science    Mental Status Exam:   Hygiene:   good  Cooperation:  Cooperative  Eye Contact:  Good  Psychomotor Behavior:  Appropriate  Affect:  Appropriate  Mood: depressed and anxious  Speech:  Normal  Thought Process:  Goal directed  Thought Content:  Normal  Suicidal:  None  Homicidal:  None  Hallucinations:  None  Delusion:  None  Memory:  Intact  Orientation:  Person, Place, Time and Situation  Reliability:  good  Insight:  Good  Judgement:  Good  Impulse Control:  Good  Physical/Medical Issues:  No      Review of Systems:  Review of Systems   Constitutional: Positive for appetite change and fatigue.  "  HENT: Negative for drooling, sore throat and trouble swallowing.    Respiratory: Negative for cough and shortness of breath.    Cardiovascular: Negative for chest pain and palpitations.   Gastrointestinal: Positive for diarrhea.        Chronic, which has improved   Genitourinary: Negative for difficulty urinating and menstrual problem.   Musculoskeletal: Negative.    Neurological: Negative for seizures.   Psychiatric/Behavioral: Positive for decreased concentration. Negative for hallucinations, self-injury, sleep disturbance and suicidal ideas. The patient is nervous/anxious. The patient is not hyperactive.          Physical Exam:  Physical Exam  Psychiatric:         Attention and Perception: Attention and perception normal.         Mood and Affect: Mood is anxious and depressed.         Speech: Speech normal.         Behavior: Behavior normal. Behavior is cooperative.         Thought Content: Thought content normal.         Cognition and Memory: Cognition and memory normal.         Judgment: Judgment normal.         Vital Signs:   /66   Ht 154.9 cm (61\")   Wt 61.7 kg (136 lb)   BMI 25.70 kg/m²      Lab Results:   No visits with results within 6 Month(s) from this visit.   Latest known visit with results is:   No results found for any previous visit.       EKG Results:  No orders to display       Imaging Results:  No Images in the past 120 days found..      Assessment/Plan   Diagnoses and all orders for this visit:    1. Moderate episode of recurrent major depressive disorder (HCC) (Primary)  -     escitalopram (LEXAPRO) 20 MG tablet; Take 1 tablet by mouth Daily for 180 days. Indications: Generalized Anxiety Disorder, Major Depressive Disorder  Dispense: 30 tablet; Refill: 5  -     Ambulatory Referral to Psychotherapy    2. Generalized anxiety disorder  -     busPIRone (BUSPAR) 5 MG tablet; Take 1 tablet by mouth 2 (Two) Times a Day. Indications: Anxiety Disorder  Dispense: 60 tablet; Refill: 2  -     " escitalopram (LEXAPRO) 20 MG tablet; Take 1 tablet by mouth Daily for 180 days. Indications: Generalized Anxiety Disorder, Major Depressive Disorder  Dispense: 30 tablet; Refill: 5  -     Ambulatory Referral to Psychotherapy    3. Attention and concentration deficit  -     Ambulatory Referral to Psychotherapy  -     Ambulatory Referral to Psychology        Visit Diagnoses:    ICD-10-CM ICD-9-CM   1. Moderate episode of recurrent major depressive disorder (HCC)  F33.1 296.32   2. Generalized anxiety disorder  F41.1 300.02   3. Attention and concentration deficit  R41.840 799.51       PLAN:  1. Safety: No acute safety concerns  2. Therapy: Referral Made Patient to contact provider and set up appointment.  And to contact office if unable to get an appointment scheduled. Irina Loredo [facility]  Address: [address] [address] and [address] [place] [address], [phone]  Phone: [phone]  [address], [place] [address]   Services offered: Family, couples, and individual therapy for a wide variety of areas, such as mood disorders, personality disorders, psychosis, addiction, anxiety, coping skills, grief, trauma and PTSD, transgender, self-harming, suicidal ideation, and other. Multiple types of therapy offered, including dialectical, trauma focused, and more.  Insurance accepted: [place], [place], hospitals and [facility], [facility], Humana, Medicaid, WellCare, Out of Network.  3. Risk Assessment: Risk of self-harm acutely is moderate.  Risk factors include anxiety disorder, mood disorder, history of self-harm in the past,  and recent psychosocial stressors (pandemic). Protective factors include no family history, denies access to guns/weapons, no present SI, no history of suicide attempts in the past, deniesAODA, healthcare seeking, future orientation, willingness to engage in care.  Risk of self-harm chronically is also  moderate, but could be further elevated in the event of treatment noncompliance and/or AODA.  4. Meds:  Change Lexapro from 10 mg to 20 mg by mouth daily at night per patient preference to target depression and anxiety. Discussed all risks, benefits, alternatives, and side effects of Lexapro including but not limited to GI upset, sexual dysfunction, bleeding risk, seizure risk, insomnia, sedation, headache, activation of iris or hypomania, increased fragility fracture risk, hyponatremia, ocular effects, withdrawal syndrome following abrupt discontinuation, serotonin syndrome, and activation of suicidal ideation and behavior. Pt educated on the need to practice safe sex while taking this med. Discussed the need for pt to immediately call the office for any new or worsening symptoms, such as worsening depression; feeling nervous or restless; suicidal thoughts or actions; or other changes changes in mood or behavior, and all other concerns. Pt educated on med compliance and the risks of suddenly stopping this medication or missing doses. Pt verbalized understanding and is agreeable to taking Lexapro. Addressed all questions and concerns.     Change Buspar frequency from as needed to 5 mg by mouth twice daily routinely to target anxiety.  Risks, benefits, alternatives discussed with patient including nausea, GI upset, mild sedation, falls risk.  After discussion of these risks and benefits, the patient voiced understanding and agreed to proceed.    5. Labs: n/a     6.  Referral to Neuropsychological testing for ADHD, patient has family history and symptoms beginning in elementary school.  However, denies testing, and current symptoms are questionable in regards to a confirmative diagnosis of ADHD, recommend further testing.  Patient to contact provider and set up appointment.  And to contact office if unable to get an appointment scheduled.   Dr. Duane Logan, Psychologist, MS, LPP  [address]  [place] [address]  [phone]   Currently only accepting referrals for psychological testing services.  Accepts Most insurance plans except Medicare Plans    Other facilities listed and printed for patient under patient instructions as testing availability may be limited.     Patient screened positive for depression based on a PHQ-9 score of 14 on 12/28/2021. Follow-up recommendations include: Prescribed antidepressant medication treatment, Referral to Mental Health specialist and Suicide Risk Assessment performed.           TREATMENT PLAN/GOALS: Continue supportive psychotherapy efforts and medications as indicated. Treatment and medication options discussed during today's visit. Patient acknowledged and verbally consented to continue with current treatment plan and was educated on the importance of compliance with treatment and follow-up appointments.    MEDICATION ISSUES:  CRISTA reviewed as expected.  Discussed medication options and treatment plan of prescribed medication as well as the risks, benefits, and side effects including potential falls, possible impaired driving and metabolic adversities among others. Patient is agreeable to call the office with any worsening of symptoms or onset of side effects. Patient is agreeable to call 911 or go to the nearest ER should he/she begin having SI/HI. No medication side effects or related complaints today.     MEDS ORDERED DURING VISIT:  New Medications Ordered This Visit   Medications   • busPIRone (BUSPAR) 5 MG tablet     Sig: Take 1 tablet by mouth 2 (Two) Times a Day. Indications: Anxiety Disorder     Dispense:  60 tablet     Refill:  2     Do not fill  Patient previously prescribed as needed, changed to twice daily routinely   • escitalopram (LEXAPRO) 20 MG tablet     Sig: Take 1 tablet by mouth Daily for 180 days. Indications: Generalized Anxiety Disorder, Major Depressive Disorder     Dispense:  30 tablet     Refill:  5       Return in about 4  weeks (around 1/25/2022) for Recheck.         I spent 67 minutes caring for Chon on this date of service. This time includes time spent by me in the following activities: preparing for the visit, reviewing tests, obtaining and/or reviewing a separately obtained history, performing a medically appropriate examination and/or evaluation, counseling and educating the patient/family/caregiver, ordering medications, tests, or procedures, referring and communicating with other health care professionals, documenting information in the medical record and care coordination.      This document has been electronically signed by ANIKA Goetz  December 28, 2021 13:55 EST      Part of this note may be an electronic transcription/translation of spoken language to printed text using the Dragon Dictation System.

## 2022-01-24 RX ORDER — ESCITALOPRAM OXALATE 10 MG/1
TABLET ORAL
Qty: 30 TABLET | OUTPATIENT
Start: 2022-01-24

## 2022-01-25 ENCOUNTER — OFFICE VISIT (OUTPATIENT)
Dept: BEHAVIORAL HEALTH | Facility: CLINIC | Age: 22
End: 2022-01-25

## 2022-01-25 VITALS
BODY MASS INDEX: 26.06 KG/M2 | DIASTOLIC BLOOD PRESSURE: 68 MMHG | SYSTOLIC BLOOD PRESSURE: 120 MMHG | WEIGHT: 138 LBS | HEIGHT: 61 IN

## 2022-01-25 DIAGNOSIS — R41.840 ATTENTION AND CONCENTRATION DEFICIT: ICD-10-CM

## 2022-01-25 DIAGNOSIS — F33.1 MODERATE EPISODE OF RECURRENT MAJOR DEPRESSIVE DISORDER: Primary | ICD-10-CM

## 2022-01-25 DIAGNOSIS — F41.1 GENERALIZED ANXIETY DISORDER: ICD-10-CM

## 2022-01-25 PROCEDURE — 99213 OFFICE O/P EST LOW 20 MIN: CPT | Performed by: NURSE PRACTITIONER

## 2022-01-25 NOTE — PATIENT INSTRUCTIONS
1.  Please return to clinic at your next scheduled visit.  Contact the clinic ([phone]) at least 24 hours prior in the event you need to cancel.  2.  Do no harm to yourself or others.    3.  Avoid alcohol and drugs.    4.  Take all medications as prescribed.  Please contact the clinic with any concerns. If you are in need of medication refills, please call the clinic at [phone].    5. Should you want to get in touch with your provider, ANIKA Goetz, please utilize MDCapsule or contact the office ([phone]), and staff will be able to page the provider on call directly.  6.  In the event you have personal crisis, contact the following crisis numbers: Suicide Prevention Hotline 1-951.896.4047; NAMRATA Helpline 1-205-804-CGYW; [facility] [phone]; text HELLO to 604497; or 851.     SPECIFIC RECOMMENDATIONS:     1.      Medications discussed at this encounter:                   - Continue Lexapro 20 mg by mouth daily, change from night time to daytime morning or early afternoon prior to going to work at 1 pm.  Continue Buspar 5 mg by mouth twice daily, try to set alarm to take dose with Lexapro and then take at night.      2.      Psychotherapy recommendations: schedule appointment with therapist and provider for ADHD testing     3.     Return to clinic: 4 weeks   4.  Try to adjust schedule to sleep upon returning home from work so you can wake up earlier to get other tasks completed.

## 2022-01-25 NOTE — PROGRESS NOTES
"Subjective   Chon Lopez is a 21 y.o. female who presents today for follow up    Referring Provider:  No referring provider defined for this encounter.    Chief Complaint:  anxiety    History of Present Illness:  Patient with a history of anxiety and depression beginning treatment 2/2020.  History of self harm behavior by cutting thighs with razor blade in 8 th & 9 th grade, denies current behavior as last occurred in 9th grade.   Reports not having many friends growing up, attended [employer] from 1st thru 8th grade.  Describes environment as clique and rich families. Upon starting high school did not know anyone as only a few students from [employer] attended new school.   1/25/22: Patient presents to office today for follow up since increase in Lexapro and change of Buspar to routine.   Admits to having problems with taking the Buspar twice day, always remembers taking at night with the Lexapro.  Does try to take prior to work at 1 pm.   Admits to having difficulty with sleeping which has worsen. Sleeps approximately 5 hrs without night time awakenings, difficulty with sleep latency. Patient describes laying starring at ceiling, denies use of TV or cell phone, though puts cell phone away when ready to sleep.  Problems with sleep latency began after dose increase from 10 to 20 mg on 12/28/21.   Patient reports difficulty concentrating has improved along with depressive and anxiety symptoms.   Admits to having passive thoughts or statements after a minor incident as patient states, \"I will say I wanna kill myself, but I don't\" while laughing.    Has not been able to reach out to therapist or neuropsychological adhd testing due to working over time as several employees have been out with COVID     12/28/21:  Currently takes Buspar as needed which is once every few weeks, denies side effects of dizziness, sedation, or grogginess.     Appetite has always been bad, as patient explains eating 1-2 times " "per day and some snacking. Able to maintain weight without drastic changes.  Admits to problem with weight gain, as teenager would eat once a day, \"starved myself\", denies self induced vomiting, excessive exercise or binge eating.     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 Sleeps soundly without night time awakenings.  Very rare due to having to use the bathroom.  Admits to daytime fatigue, denies naps, low energy, working at  not doing any extensive hard work to make self tired.     Admits to overly concentrating on one thing, then gives up, explains as intermittent, often has to force self to do things.  Will give one thing priority over other things, if not finished at that time will completely forget about it at a later time.  Admits to losing items frequently, keeps keys in one place in purse and does not touch as car is self starter and keeps on lanyard.  At times will have bursts of energy as\" the last straw in regards to cleaning room\".  Growing up in school did have difficulty with math, in English would have vocabulary sheet to help with spelling as patient had difficulty with spelling, would do testing with a learning counselor to prevent distraction from other students.  In fourth &  fifth grade had to do reading with a group of 5 students, then in middle school started having a separate area for testing.     Sister and mother both have diagnosis of ADD, sister was diagnosed in middle school and was treated with medications which caused \"zombie\" like behavior.  Believes mother is no longer on medications and sister is not taking any medications for ADD either.  Patient denies testing for ADHD.     Admits to someday's are more productive, occasional experiences of distractibility, and forgets what task was initially intended.  Denies keeping a list or planner, has tried to use a " planner and wants to use more.  ADHD 6 question screener with varied answers of often to sometimes to very often.  Most impairing symptoms are depression and anxiety today.        PHQ-9 Depression Screening  PHQ-9 Total Score:   12/28/2021 14 , reassess  3/2022    Little interest or pleasure in doing things?     Feeling down, depressed, or hopeless?     Trouble falling or staying asleep, or sleeping too much?     Feeling tired or having little energy?     Poor appetite or overeating?     Feeling bad about yourself - or that you are a failure or have let yourself or your family down?     Trouble concentrating on things, such as reading the newspaper or watching television?     Moving or speaking so slowly that other people could have noticed? Or the opposite - being so fidgety or restless that you have been moving around a lot more than usual?     Thoughts that you would be better off dead, or of hurting yourself in some way?     PHQ-9 Total Score       KIMBERLEE-7    12/28/2021 14 , reassess  3/2022    Past Surgical History:  Past Surgical History:   Procedure Laterality Date   • LASIK     • WISDOM TOOTH EXTRACTION         Problem List:  Patient Active Problem List   Diagnosis   • Anxiety       Allergy:   Allergies   Allergen Reactions   • Eggs Or Egg-Derived Products GI Intolerance   • Hydrocodone Rash and Headache        Discontinued Medications:  There are no discontinued medications.    Current Medications:   Current Outpatient Medications   Medication Sig Dispense Refill   • busPIRone (BUSPAR) 5 MG tablet Take 1 tablet by mouth 2 (Two) Times a Day. Indications: Anxiety Disorder 60 tablet 2   • escitalopram (LEXAPRO) 20 MG tablet Take 1 tablet by mouth Daily for 180 days. Indications: Generalized Anxiety Disorder, Major Depressive Disorder 30 tablet 5     No current facility-administered medications for this visit.       Past Medical History:  History reviewed. No pertinent past medical history.    Past Psychiatric  History:  Began Treatment:2020  Diagnoses:Depression and Anxiety  Psychiatrist:Denies  Therapist:middle school age only  Admission History:Denies  Medication Trials:Zoloft 2020-2020 somewhat effective only 25%  Self Harm: middle school- cut thighs with razor blade, 8th grade & 9th grade, would also pull out hair  Suicide Attempts:Denies   Psychosis, Anxiety, Depression: Denies    Substance Abuse History:   Types:Denies all, including illicit  Withdrawal Symptoms:Denies  Longest Period Sober:Not Applicable   AA: Not applicable     Social History:  Martial Status:Single  Employed:Yes and If so, where [employer] and [employer]-  2nd shift  Kids:No  House:Lives in a house with parents and sister   History: [employer] briefly kicked out of [employer]  Access to Guns:  no    Social History     Socioeconomic History   • Marital status: Single   Tobacco Use   • Smoking status: Never Smoker   • Smokeless tobacco: Never Used   Vaping Use   • Vaping Use: Never used   Substance and Sexual Activity   • Alcohol use: Yes     Comment: 2-3 glasses of wine every 2-3 weeks   • Drug use: Never   • Sexual activity: Defer       Family History:   Suicide Attempts: Denies  Suicide Completions:Denies      Family History   Problem Relation Age of Onset   • Hyperlipidemia Maternal Grandfather    • Breast cancer Paternal Grandmother    • Diabetes Paternal Grandfather    • Anxiety disorder Mother    • ADD / ADHD Mother    • ADD / ADHD Sister        Developmental History:   Born: [place]  Siblings:1 sister younger  Childhood: Denies Abuse  High School:Completed  College:some college, [employer] then [employer] education, political science    Mental Status Exam:   Hygiene:   good  Cooperation:  Cooperative  Eye Contact:  Good  Psychomotor Behavior:  Appropriate  Affect:  Appropriate  Mood: normal  Speech:  Normal  Thought Process:  Goal directed  Thought Content:  Normal  Suicidal:  None  Homicidal:  None  Hallucinations:   "None  Delusion:  None  Memory:  Intact  Orientation:  Person, Place, Time and Situation  Reliability:  good  Insight:  Good  Judgement:  Good  Impulse Control:  Good  Physical/Medical Issues:  No      Review of Systems:  Review of Systems   Constitutional: Positive for appetite change and fatigue.   HENT: Negative for drooling, sore throat and trouble swallowing.    Respiratory: Negative for cough and shortness of breath.    Cardiovascular: Negative for chest pain and palpitations.   Gastrointestinal: Positive for diarrhea.        Chronic, which has improved   Genitourinary: Negative for difficulty urinating and menstrual problem.   Musculoskeletal: Negative.    Neurological: Negative for seizures.   Psychiatric/Behavioral: Positive for decreased concentration and sleep disturbance. Negative for hallucinations, self-injury and suicidal ideas. The patient is nervous/anxious. The patient is not hyperactive.          Physical Exam:  Physical Exam  Psychiatric:         Attention and Perception: Attention and perception normal.         Mood and Affect: Mood and affect normal.         Speech: Speech normal.         Behavior: Behavior normal. Behavior is cooperative.         Thought Content: Thought content normal.         Cognition and Memory: Cognition and memory normal.         Judgment: Judgment normal.         Vital Signs:   /68   Ht 154.9 cm (61\")   Wt 62.6 kg (138 lb)   BMI 26.07 kg/m²      Lab Results:   No visits with results within 6 Month(s) from this visit.   Latest known visit with results is:   No results found for any previous visit.       EKG Results:  No orders to display       Imaging Results:  No Images in the past 120 days found..      Assessment/Plan   Diagnoses and all orders for this visit:    1. Moderate episode of recurrent major depressive disorder (HCC) (Primary)    2. Generalized anxiety disorder    3. Attention and concentration deficit        Visit Diagnoses:    ICD-10-CM ICD-9-CM "   1. Moderate episode of recurrent major depressive disorder (HCC)  F33.1 296.32   2. Generalized anxiety disorder  F41.1 300.02   3. Attention and concentration deficit  R41.840 799.51       PLAN:  1. Safety: No acute safety concerns  2. Therapy: Referral Made 12/28/21 Patient to contact provider and set up appointment.  And to contact office if unable to get an appointment scheduled. [facility]  Address: [address], [facility] [address] and [address] [place] [address], [phone]  Phone: [phone]  Other location: [address] [place] [address] [place] [address], [facility] [address]   Services offered: Family, couples, and individual therapy for a wide variety of areas, such as mood disorders, personality disorders, psychosis, addiction, anxiety, coping skills, grief, trauma and PTSD, transgender, self-harming, suicidal ideation, and other. Multiple types of therapy offered, including dialectical, trauma focused, and more.  Insurance accepted: [place], [place], [place] [facility], [facility], Hum[employer], Medicaid, WellCare, Out of Network.  3. Risk Assessment: Risk of self-harm acutely is moderate.  Risk factors include anxiety disorder, mood disorder, history of self-harm in the past,  and recent psychosocial stressors (pandemic). Protective factors include no family history, denies access to guns/weapons, no present SI, no history of suicide attempts in the past, deniesAODA, healthcare seeking, future orientation, willingness to engage in care.  Risk of self-harm chronically is also moderate, but could be further elevated in the event of treatment noncompliance and/or AODA.  Meds:  Continue Lexapro 20 mg by mouth daily in the morning or prior to work at 12-1pm vs at night due to difficulty falling asleep to target depression and anxiety.     Continue Buspar 5 mg by mouth twice daily routinely to target anxiety.  Encouraged to take with Lexapro in the morning or early  "afternoon prior to work and to take prior to bed at night.  Set alarm for reminders.    4. Labs: n/a     5.   Referral to Neuropsychological testing for ADHD,on 12/28/21, patient has not yet scheduled due to work schedule, plans to schedule.     1/25/22:  Patient instructed to start taking Lexapro 20 with Buspar 5 mg in the morning or early afternoon at 12 or 1 pm vs night time as patient having difficulty with sleep latency since change from 10 to 20 mg at last visit.  Encouraged reminders to take medications and if symptoms of difficulty falling asleep continue to notify provider.  Patient encouraged to change schedule to \"day shift\" as patient used to work night and now works 2nd shift, as patient wishes to get up earlier to complete tasks prior to going to work. Patient to schedule appointment with therapist and ADHD testing providers as patient reports still having list.       12/28/21:  Change Lexapro from 10 mg to 20 mg by mouth daily at night per patient preference to target depression and anxiety.  Change Buspar frequency from as needed to 5 mg by mouth twice daily routinely to target anxiety.   Referral to therapy with Irina Loredo, Patient to contact provider and set up appointment.  And to contact office if unable to get an appointment scheduled.   Referral to Neuropsychological testing for ADHD, patient has family history and symptoms beginning in elementary school.  However, denies testing, and current symptoms are questionable in regards to a confirmative diagnosis of ADHD, recommend further testing.  Patient to contact provider and set up appointment.  And to contact office if unable to get an appointment scheduled.   Dr. Duane Logan, Psychologist, MS, LPP  [address], [address]  [place] [id number] [phone]   Currently only accepting referrals for psychological testing services.  Accepts Most insurance plans except Medicare Plans    Other facilities listed and printed " for patient under patient instructions as testing availability may be limited.       Patient screened positive for depression based on a PHQ-9 score of 14 on 12/28/2021. Follow-up recommendations include: Prescribed antidepressant medication treatment, Referral to Mental Health specialist and Suicide Risk Assessment performed.           TREATMENT PLAN/GOALS: Continue supportive psychotherapy efforts and medications as indicated. Treatment and medication options discussed during today's visit. Patient acknowledged and verbally consented to continue with current treatment plan and was educated on the importance of compliance with treatment and follow-up appointments.    MEDICATION ISSUES:  CRISTA reviewed as expected.  Discussed medication options and treatment plan of prescribed medication as well as the risks, benefits, and side effects including potential falls, possible impaired driving and metabolic adversities among others. Patient is agreeable to call the office with any worsening of symptoms or onset of side effects. Patient is agreeable to call 911 or go to the nearest ER should he/she begin having SI/HI. No medication side effects or related complaints today.     MEDS ORDERED DURING VISIT:  No orders of the defined types were placed in this encounter.      Return in about 4 weeks (around 2/22/2022).         I spent 22 minutes caring for Chon on this date of service. This time includes time spent by me in the following activities: preparing for the visit, performing a medically appropriate examination and/or evaluation, counseling and educating the patient/family/caregiver, referring and communicating with other health care professionals and documenting information in the medical record.      This document has been electronically signed by ANIKA Goezt  January 25, 2022 12:20 EST      Part of this note may be an electronic transcription/translation of spoken language to printed text using the Dragon  Dictation System.

## 2022-02-25 ENCOUNTER — TELEPHONE (OUTPATIENT)
Dept: PSYCHIATRY | Facility: CLINIC | Age: 22
End: 2022-02-25

## 2022-06-21 ENCOUNTER — OFFICE VISIT (OUTPATIENT)
Dept: FAMILY MEDICINE CLINIC | Age: 22
End: 2022-06-21

## 2022-06-21 VITALS
OXYGEN SATURATION: 98 % | SYSTOLIC BLOOD PRESSURE: 111 MMHG | HEART RATE: 99 BPM | HEIGHT: 61 IN | WEIGHT: 136.8 LBS | BODY MASS INDEX: 25.83 KG/M2 | DIASTOLIC BLOOD PRESSURE: 73 MMHG

## 2022-06-21 DIAGNOSIS — F41.8 ANXIETY WITH DEPRESSION: Primary | ICD-10-CM

## 2022-06-21 PROCEDURE — 99213 OFFICE O/P EST LOW 20 MIN: CPT | Performed by: NURSE PRACTITIONER

## 2022-06-21 RX ORDER — ESCITALOPRAM OXALATE 20 MG/1
20 TABLET ORAL DAILY
Qty: 90 TABLET | Refills: 1 | Status: SHIPPED | OUTPATIENT
Start: 2022-06-21 | End: 2022-12-21 | Stop reason: SDUPTHER

## 2022-06-21 RX ORDER — BUSPIRONE HYDROCHLORIDE 5 MG/1
5 TABLET ORAL 2 TIMES DAILY
Qty: 180 TABLET | Refills: 1
Start: 2022-06-21 | End: 2022-12-21 | Stop reason: SDUPTHER

## 2022-06-21 NOTE — ASSESSMENT & PLAN NOTE
Reports that symptoms are stable and wants to continue current treatment.  Refills are provided.  Follow-up in 6 months or sooner if concerns.

## 2022-06-21 NOTE — PROGRESS NOTES
"Chief Complaint  Anxiety (6 month )    Subjective  Patient is a 22-year-old female who is here today to follow-up on anxiety and depression.  She reports symptoms are stable on escitalopram 20 mg daily and buspirone 5 mg twice daily.  She is better about remembering second dose of buspirone daily.  Last menstrual cycle is current.  She denies medication side effects.  Requests refills.        Chon Lopez presents to [facility]          Objective   Vital Signs:   Vitals:    06/21/22 1045   BP: 111/73   BP Location: Left arm   Patient Position: Sitting   Cuff Size: Adult   Pulse: 99   SpO2: 98%   Weight: 62.1 kg (136 lb 12.8 oz)   Height: 154.9 cm (61\")      Body mass index is 25.85 kg/m².  Physical Exam  Vitals reviewed.   Constitutional:       General: She is not in acute distress.     Appearance: Normal appearance. She is well-developed.   Neck:      Thyroid: No thyroid mass, thyromegaly or thyroid tenderness.   Cardiovascular:      Rate and Rhythm: Normal rate and regular rhythm.      Heart sounds: Normal heart sounds.   Pulmonary:      Effort: Pulmonary effort is normal.      Breath sounds: Normal breath sounds.   Musculoskeletal:      Right lower leg: No edema.      Left lower leg: No edema.   Skin:     General: Skin is warm and dry.   Neurological:      General: No focal deficit present.      Mental Status: She is alert.   Psychiatric:         Attention and Perception: Attention normal.         Mood and Affect: Mood and affect normal.         Behavior: Behavior normal.          Result Review :                Assessment and Plan    Diagnoses and all orders for this visit:    1. Anxiety with depression (Primary)  Assessment & Plan:  Reports that symptoms are stable and wants to continue current treatment.  Refills are provided.  Follow-up in 6 months or sooner if concerns.    Orders:  -     escitalopram (LEXAPRO) 20 MG tablet; Take 1 tablet by mouth Daily for 180 days. " Indications: Generalized Anxiety Disorder, Major Depressive Disorder  Dispense: 90 tablet; Refill: 1  -     busPIRone (BUSPAR) 5 MG tablet; Take 1 tablet by mouth 2 (Two) Times a Day. Indications: Anxiety Disorder  Dispense: 180 tablet; Refill: 1      Follow Up    No follow-ups on file.  Patient was given instructions and counseling regarding her condition or for health maintenance advice. Please see specific information pulled into the AVS if appropriate.

## 2022-12-21 ENCOUNTER — OFFICE VISIT (OUTPATIENT)
Dept: FAMILY MEDICINE CLINIC | Age: 22
End: 2022-12-21

## 2022-12-21 VITALS
HEIGHT: 61 IN | BODY MASS INDEX: 23.79 KG/M2 | OXYGEN SATURATION: 98 % | HEART RATE: 88 BPM | WEIGHT: 126 LBS | SYSTOLIC BLOOD PRESSURE: 116 MMHG | DIASTOLIC BLOOD PRESSURE: 79 MMHG

## 2022-12-21 DIAGNOSIS — R68.89 THROAT SYMPTOM: ICD-10-CM

## 2022-12-21 DIAGNOSIS — D72.829 LEUKOCYTOSIS, UNSPECIFIED TYPE: Primary | ICD-10-CM

## 2022-12-21 DIAGNOSIS — F41.8 ANXIETY WITH DEPRESSION: ICD-10-CM

## 2022-12-21 PROBLEM — F41.9 ANXIETY: Status: RESOLVED | Noted: 2021-12-21 | Resolved: 2022-12-21

## 2022-12-21 LAB
EXPIRATION DATE: NORMAL
INTERNAL CONTROL: NORMAL
Lab: NORMAL
S PYO AG THROAT QL: NEGATIVE

## 2022-12-21 PROCEDURE — 87081 CULTURE SCREEN ONLY: CPT | Performed by: NURSE PRACTITIONER

## 2022-12-21 PROCEDURE — 87880 STREP A ASSAY W/OPTIC: CPT | Performed by: NURSE PRACTITIONER

## 2022-12-21 PROCEDURE — 99214 OFFICE O/P EST MOD 30 MIN: CPT | Performed by: NURSE PRACTITIONER

## 2022-12-21 RX ORDER — ESCITALOPRAM OXALATE 20 MG/1
20 TABLET ORAL DAILY
Qty: 90 TABLET | Refills: 1 | Status: SHIPPED | OUTPATIENT
Start: 2022-12-21 | End: 2023-06-19

## 2022-12-21 RX ORDER — BUSPIRONE HYDROCHLORIDE 5 MG/1
5 TABLET ORAL 2 TIMES DAILY
Qty: 180 TABLET | Refills: 1
Start: 2022-12-21

## 2022-12-21 NOTE — ASSESSMENT & PLAN NOTE
Rapid strep is negative.  Please report any throat pain, difficulty swallowing, or any other concerns.

## 2022-12-21 NOTE — PROGRESS NOTES
"Chief Complaint  Annual Exam, Anxiety (6 month follow up), and Depression    Subjective          Chon Lopez presents to [facility]     Patient is a 22-year-old female who is here today to follow-up regarding anxiety.  She is currently taking Lexapro 20 mg daily and buspirone 5 mg twice daily.  Denies side effects and requests refills.  States she will rarely have a day with an increase in anxiety or depression.  Previously saw mental health care specialist who advised testing for ADD or ADHD.  Patient states she was unable to schedule follow-up testing and her insurance did not cover visits with Chapis Tabor.    Last menstrual cycle was 1 week ago.  Has never had a Pap smear.  I advised patient that Pap smears are recommended beginning at age 21 and approximately every 3 years or sooner if indicated.    States she ate a marijuana gummy that was provided to her by a friend late yesterday.  She vomited twice with large amounts of emesis noted and was concerned that the gummy might of been laced with another substance.  She went to [facility] emergency room and labs were done at approximately 150 this morning.  She had a scantly low sodium and potassium level but her white count level was 15.2 with elevated neutrophils at 12.68.  Patient denies signs of illness.  She denies any further episodes of vomiting.  Denies dysuria, abdominal pain, sore throat, cough, body aches.  On physical exam I noticed erythema of her throat.       Objective   Vital Signs:   Vitals:    12/21/22 0902   BP: 116/79   BP Location: Left arm   Patient Position: Sitting   Cuff Size: Adult   Pulse: 88   SpO2: 98%   Weight: 57.2 kg (126 lb)   Height: 154.9 cm (61\")      Body mass index is 23.81 kg/m².  Physical Exam  Vitals reviewed.   Constitutional:       General: She is not in acute distress.     Appearance: Normal appearance. She is well-developed.   HENT:      Right Ear: Tympanic membrane normal.      Left " Ear: Tympanic membrane normal.      Mouth/Throat:      Mouth: Mucous membranes are moist.      Pharynx: Posterior oropharyngeal erythema present. No oropharyngeal exudate.   Cardiovascular:      Rate and Rhythm: Normal rate and regular rhythm.      Heart sounds: Normal heart sounds.   Pulmonary:      Effort: Pulmonary effort is normal.      Breath sounds: Normal breath sounds.   Musculoskeletal:      Right lower leg: No edema.      Left lower leg: No edema.   Skin:     General: Skin is warm and dry.   Neurological:      General: No focal deficit present.      Mental Status: She is alert.   Psychiatric:         Attention and Perception: Attention normal.         Mood and Affect: Mood and affect normal.         Behavior: Behavior normal.           Current Outpatient Medications:   •  busPIRone (BUSPAR) 5 MG tablet, Take 1 tablet by mouth 2 (Two) Times a Day. Indications: Anxiety Disorder, Disp: 180 tablet, Rfl: 1  •  escitalopram (LEXAPRO) 20 MG tablet, Take 1 tablet by mouth Daily for 180 days. Indications: Generalized Anxiety Disorder, Major Depressive Disorder, Disp: 90 tablet, Rfl: 1       Result Review :                            Assessment and Plan    Diagnoses and all orders for this visit:    1. Leukocytosis, unspecified type (Primary)  Assessment & Plan:  Unable to void.  Denies any signs of illness.  We will recheck a blood count next week or sooner if concerns.  He is to continue to monitor for any signs of illness and report any signs of illness.    Orders:  -     POC Rapid Strep A  -     Cancel: POC Urinalysis Dipstick, Automated  -     CBC w AUTO Differential; Future    2. Throat symptom  Assessment & Plan:  Rapid strep is negative.  Please report any throat pain, difficulty swallowing, or any other concerns.    Orders:  -     Beta Strep Culture, Throat - , Throat; Future  -     Beta Strep Culture, Throat - Swab, Throat    3. Anxiety with depression  Assessment & Plan:  I reviewed visit note with Chapis  Leander and see that she was attempting to rule out ADD or ADHD.  She was provided with contact information for a psychologist and a therapist to help with any diagnoses.  Patient states she no longer has those contact numbers but would be willing to contact and schedule further testing.  I write down the information and provided to patient to contact Irina Holt for therapy and Dr Duane Burks for neuropsychological testing.  Patient states she will contact these providers to help confirm her diagnosis.  I advised patient I feel more definitive diagnosis will lead to better management of her symptoms.    Orders:  -     escitalopram (LEXAPRO) 20 MG tablet; Take 1 tablet by mouth Daily for 180 days. Indications: Generalized Anxiety Disorder, Major Depressive Disorder  Dispense: 90 tablet; Refill: 1  -     busPIRone (BUSPAR) 5 MG tablet; Take 1 tablet by mouth 2 (Two) Times a Day. Indications: Anxiety Disorder  Dispense: 180 tablet; Refill: 1      Follow Up    No follow-ups on file.  Patient was given instructions and counseling regarding her condition or for health maintenance advice. Please see specific information pulled into the AVS if appropriate.

## 2022-12-21 NOTE — ASSESSMENT & PLAN NOTE
I reviewed visit note with Chapis Tabor and see that she was attempting to rule out ADD or ADHD.  She was provided with contact information for a psychologist and a therapist to help with any diagnoses.  Patient states she no longer has those contact numbers but would be willing to contact and schedule further testing.  I write down the information and provided to patient to contact Irina Holt for therapy and Dr Duane Burks for neuropsychological testing.  Patient states she will contact these providers to help confirm her diagnosis.  I advised patient I feel more definitive diagnosis will lead to better management of her symptoms.

## 2022-12-21 NOTE — ASSESSMENT & PLAN NOTE
Unable to void.  Denies any signs of illness.  We will recheck a blood count next week or sooner if concerns.  He is to continue to monitor for any signs of illness and report any signs of illness.

## 2022-12-23 LAB — BACTERIA SPEC AEROBE CULT: NORMAL

## 2023-01-03 ENCOUNTER — TELEPHONE (OUTPATIENT)
Dept: FAMILY MEDICINE CLINIC | Age: 23
End: 2023-01-03
Payer: COMMERCIAL

## 2023-01-03 NOTE — TELEPHONE ENCOUNTER
----- Message from Shefali Jasso RN sent at 1/3/2023 11:17 AM EST -----      ----- Message -----  From: SYSTEM  Sent: 12/31/2022   1:10 AM EST  To: [facility]

## 2023-01-05 ENCOUNTER — LAB (OUTPATIENT)
Dept: LAB | Facility: HOSPITAL | Age: 23
End: 2023-01-05
Payer: COMMERCIAL

## 2023-01-05 DIAGNOSIS — D72.829 LEUKOCYTOSIS, UNSPECIFIED TYPE: ICD-10-CM

## 2023-01-05 LAB
BASOPHILS # BLD AUTO: 0.06 10*3/MM3 (ref 0–0.2)
BASOPHILS NFR BLD AUTO: 1.1 % (ref 0–1.5)
DEPRECATED RDW RBC AUTO: 40.4 FL (ref 37–54)
EOSINOPHIL # BLD AUTO: 0.14 10*3/MM3 (ref 0–0.4)
EOSINOPHIL NFR BLD AUTO: 2.5 % (ref 0.3–6.2)
ERYTHROCYTE [DISTWIDTH] IN BLOOD BY AUTOMATED COUNT: 12.2 % (ref 12.3–15.4)
HCT VFR BLD AUTO: 44.8 % (ref 34–46.6)
HGB BLD-MCNC: 14.6 G/DL (ref 12–15.9)
IMM GRANULOCYTES # BLD AUTO: 0.01 10*3/MM3 (ref 0–0.05)
IMM GRANULOCYTES NFR BLD AUTO: 0.2 % (ref 0–0.5)
LYMPHOCYTES # BLD AUTO: 2.08 10*3/MM3 (ref 0.7–3.1)
LYMPHOCYTES NFR BLD AUTO: 36.5 % (ref 19.6–45.3)
MCH RBC QN AUTO: 28.9 PG (ref 26.6–33)
MCHC RBC AUTO-ENTMCNC: 32.6 G/DL (ref 31.5–35.7)
MCV RBC AUTO: 88.7 FL (ref 79–97)
MONOCYTES # BLD AUTO: 0.37 10*3/MM3 (ref 0.1–0.9)
MONOCYTES NFR BLD AUTO: 6.5 % (ref 5–12)
NEUTROPHILS NFR BLD AUTO: 3.04 10*3/MM3 (ref 1.7–7)
NEUTROPHILS NFR BLD AUTO: 53.2 % (ref 42.7–76)
PLATELET # BLD AUTO: 233 10*3/MM3 (ref 140–450)
PMV BLD AUTO: 11 FL (ref 6–12)
RBC # BLD AUTO: 5.05 10*6/MM3 (ref 3.77–5.28)
WBC NRBC COR # BLD: 5.7 10*3/MM3 (ref 3.4–10.8)

## 2023-01-05 PROCEDURE — 85025 COMPLETE CBC W/AUTO DIFF WBC: CPT

## 2023-01-05 PROCEDURE — 36415 COLL VENOUS BLD VENIPUNCTURE: CPT

## 2023-01-06 NOTE — PROGRESS NOTES
Your white blood cell count is back to normal range.  Blood count is improved.  You are not anemic.

## 2023-06-27 PROBLEM — D72.829 LEUKOCYTOSIS: Status: RESOLVED | Noted: 2022-12-21 | Resolved: 2023-06-27

## 2023-06-27 PROBLEM — R68.89 THROAT SYMPTOM: Status: RESOLVED | Noted: 2022-12-21 | Resolved: 2023-06-27

## 2023-08-07 DIAGNOSIS — F41.8 ANXIETY WITH DEPRESSION: ICD-10-CM

## 2023-08-07 RX ORDER — BUSPIRONE HYDROCHLORIDE 5 MG/1
5 TABLET ORAL 2 TIMES DAILY
Qty: 180 TABLET | Refills: 1 | Status: SHIPPED | OUTPATIENT
Start: 2023-08-07

## 2023-11-15 ENCOUNTER — LAB (OUTPATIENT)
Dept: LAB | Facility: HOSPITAL | Age: 23
End: 2023-11-15
Payer: COMMERCIAL

## 2023-11-15 ENCOUNTER — TRANSCRIBE ORDERS (OUTPATIENT)
Dept: ADMINISTRATIVE | Facility: HOSPITAL | Age: 23
End: 2023-11-15
Payer: COMMERCIAL

## 2023-11-15 DIAGNOSIS — L30.9 ACUTE DERMATITIS: ICD-10-CM

## 2023-11-15 DIAGNOSIS — L30.9 ACUTE DERMATITIS: Primary | ICD-10-CM

## 2023-11-15 LAB
ALBUMIN SERPL-MCNC: 4.9 G/DL (ref 3.5–5.2)
ALBUMIN/GLOB SERPL: 2.6 G/DL
ALP SERPL-CCNC: 66 U/L (ref 39–117)
ALT SERPL W P-5'-P-CCNC: 18 U/L (ref 1–33)
ANION GAP SERPL CALCULATED.3IONS-SCNC: 7 MMOL/L (ref 5–15)
AST SERPL-CCNC: 13 U/L (ref 1–32)
BASOPHILS # BLD AUTO: 0.05 10*3/MM3 (ref 0–0.2)
BASOPHILS NFR BLD AUTO: 0.9 % (ref 0–1.5)
BILIRUB SERPL-MCNC: 0.6 MG/DL (ref 0–1.2)
BILIRUB UR QL STRIP: NEGATIVE
BUN SERPL-MCNC: 11 MG/DL (ref 6–20)
BUN/CREAT SERPL: 12.2 (ref 7–25)
CALCIUM SPEC-SCNC: 9.6 MG/DL (ref 8.6–10.5)
CHLORIDE SERPL-SCNC: 109 MMOL/L (ref 98–107)
CHOLEST SERPL-MCNC: 215 MG/DL (ref 0–200)
CHROMATIN AB SERPL-ACNC: 12 IU/ML (ref 0–14)
CLARITY UR: CLEAR
CO2 SERPL-SCNC: 25 MMOL/L (ref 22–29)
COLOR UR: YELLOW
CREAT SERPL-MCNC: 0.9 MG/DL (ref 0.57–1)
CRP SERPL-MCNC: <0.3 MG/DL (ref 0–0.5)
DEPRECATED RDW RBC AUTO: 41.2 FL (ref 37–54)
EGFRCR SERPLBLD CKD-EPI 2021: 91.2 ML/MIN/1.73
EOSINOPHIL # BLD AUTO: 0.1 10*3/MM3 (ref 0–0.4)
EOSINOPHIL NFR BLD AUTO: 1.7 % (ref 0.3–6.2)
ERYTHROCYTE [DISTWIDTH] IN BLOOD BY AUTOMATED COUNT: 12.3 % (ref 12.3–15.4)
ERYTHROCYTE [SEDIMENTATION RATE] IN BLOOD: 1 MM/HR (ref 0–20)
GLOBULIN UR ELPH-MCNC: 1.9 GM/DL
GLUCOSE SERPL-MCNC: 104 MG/DL (ref 65–99)
GLUCOSE UR STRIP-MCNC: NEGATIVE MG/DL
HCT VFR BLD AUTO: 47.1 % (ref 34–46.6)
HDLC SERPL-MCNC: 58 MG/DL (ref 40–60)
HGB BLD-MCNC: 15 G/DL (ref 12–15.9)
HGB UR QL STRIP.AUTO: NEGATIVE
IMM GRANULOCYTES # BLD AUTO: 0.02 10*3/MM3 (ref 0–0.05)
IMM GRANULOCYTES NFR BLD AUTO: 0.3 % (ref 0–0.5)
KETONES UR QL STRIP: NEGATIVE
LDLC SERPL CALC-MCNC: 143 MG/DL (ref 0–100)
LDLC/HDLC SERPL: 2.44 {RATIO}
LEUKOCYTE ESTERASE UR QL STRIP.AUTO: NEGATIVE
LYMPHOCYTES # BLD AUTO: 1.72 10*3/MM3 (ref 0.7–3.1)
LYMPHOCYTES NFR BLD AUTO: 30.1 % (ref 19.6–45.3)
MCH RBC QN AUTO: 28.6 PG (ref 26.6–33)
MCHC RBC AUTO-ENTMCNC: 31.8 G/DL (ref 31.5–35.7)
MCV RBC AUTO: 89.9 FL (ref 79–97)
MONOCYTES # BLD AUTO: 0.3 10*3/MM3 (ref 0.1–0.9)
MONOCYTES NFR BLD AUTO: 5.2 % (ref 5–12)
NEUTROPHILS NFR BLD AUTO: 3.53 10*3/MM3 (ref 1.7–7)
NEUTROPHILS NFR BLD AUTO: 61.8 % (ref 42.7–76)
NITRITE UR QL STRIP: NEGATIVE
PH UR STRIP.AUTO: 6.5 [PH] (ref 5–8)
PLATELET # BLD AUTO: 217 10*3/MM3 (ref 140–450)
PMV BLD AUTO: 11.5 FL (ref 6–12)
POTASSIUM SERPL-SCNC: 3.8 MMOL/L (ref 3.5–5.2)
PROT SERPL-MCNC: 6.8 G/DL (ref 6–8.5)
PROT UR QL STRIP: NEGATIVE
RBC # BLD AUTO: 5.24 10*6/MM3 (ref 3.77–5.28)
SODIUM SERPL-SCNC: 141 MMOL/L (ref 136–145)
SP GR UR STRIP: >=1.03 (ref 1–1.03)
TRIGL SERPL-MCNC: 77 MG/DL (ref 0–150)
UROBILINOGEN UR QL STRIP: NORMAL
VLDLC SERPL-MCNC: 14 MG/DL (ref 5–40)
WBC NRBC COR # BLD: 5.72 10*3/MM3 (ref 3.4–10.8)

## 2023-11-15 PROCEDURE — 36415 COLL VENOUS BLD VENIPUNCTURE: CPT

## 2023-11-15 PROCEDURE — 80061 LIPID PANEL: CPT

## 2023-11-15 PROCEDURE — 81003 URINALYSIS AUTO W/O SCOPE: CPT

## 2023-11-15 PROCEDURE — 85652 RBC SED RATE AUTOMATED: CPT

## 2023-11-15 PROCEDURE — 86431 RHEUMATOID FACTOR QUANT: CPT

## 2023-11-15 PROCEDURE — 85025 COMPLETE CBC W/AUTO DIFF WBC: CPT

## 2023-11-15 PROCEDURE — 86038 ANTINUCLEAR ANTIBODIES: CPT

## 2023-11-15 PROCEDURE — 86140 C-REACTIVE PROTEIN: CPT

## 2023-11-15 PROCEDURE — 80053 COMPREHEN METABOLIC PANEL: CPT

## 2023-11-15 PROCEDURE — 86060 ANTISTREPTOLYSIN O TITER: CPT

## 2023-11-17 LAB
ANA SER QL: NEGATIVE
ASO AB SERPL-ACNC: <20 IU/ML (ref 0–200)

## 2023-12-28 ENCOUNTER — OFFICE VISIT (OUTPATIENT)
Dept: FAMILY MEDICINE CLINIC | Age: 23
End: 2023-12-28
Payer: COMMERCIAL

## 2023-12-28 VITALS
HEART RATE: 75 BPM | BODY MASS INDEX: 24.92 KG/M2 | WEIGHT: 132 LBS | OXYGEN SATURATION: 100 % | DIASTOLIC BLOOD PRESSURE: 66 MMHG | HEIGHT: 61 IN | SYSTOLIC BLOOD PRESSURE: 122 MMHG

## 2023-12-28 DIAGNOSIS — F41.8 ANXIETY WITH DEPRESSION: Primary | ICD-10-CM

## 2023-12-28 DIAGNOSIS — E78.2 MIXED HYPERLIPIDEMIA: ICD-10-CM

## 2023-12-28 PROCEDURE — 99214 OFFICE O/P EST MOD 30 MIN: CPT | Performed by: NURSE PRACTITIONER

## 2023-12-28 RX ORDER — ESCITALOPRAM OXALATE 20 MG/1
20 TABLET ORAL DAILY
Qty: 90 TABLET | Refills: 0 | Status: SHIPPED | OUTPATIENT
Start: 2023-12-28 | End: 2024-06-25

## 2023-12-28 RX ORDER — QUETIAPINE FUMARATE 25 MG/1
12.5 TABLET, FILM COATED ORAL
Qty: 15 TABLET | Refills: 2 | Status: SHIPPED | OUTPATIENT
Start: 2023-12-28

## 2023-12-28 RX ORDER — BUSPIRONE HYDROCHLORIDE 5 MG/1
5 TABLET ORAL 2 TIMES DAILY
Qty: 180 TABLET | Refills: 0 | Status: SHIPPED | OUTPATIENT
Start: 2023-12-28

## 2023-12-28 NOTE — PROGRESS NOTES
"Chief Complaint  Anxiety (6 month follow up - having trouble remembering to take medication ) and Depression    Subjective          Chon Lopez presents to [facility]     Patient is a 23-year-old female who is here today to follow-up regarding anxiety and depression.  Under increased stress with a job that requires working different shifts and doing the work for many different employees.  Is working in the [employer] industry which she wants to remain but feels very stressed by current job.  She forgets to take her medicine on some days.  Current treatment is Lexapro 20 mg daily and buspirone 5 mg twice per day.  Persists with concerns for attention deficit disorder and mood swings.  Has continued to defer referral to behavioral health due to time constraints.  She did have labs done recently by dermatologist that she had seen due to a rash that is now resolved.  She denies suicidal ideation.  Last menstrual cycle was current.  Symptoms do worsen on her menstrual cycle.  Reports difficulty sleeping with delayed onset of sleep.  Does alternate between not feeling the need to sleep much and wanting to sleep much more.  She has decreased concentration.    Recent labs done per dermatology were normal with exception of some mild hyperlipidemia.  Patient admits her diet is not healthy and she eats 1 meal per day.     Objective   Vital Signs:   Vitals:    12/28/23 1039   BP: 122/66   BP Location: Left arm   Patient Position: Sitting   Cuff Size: Adult   Pulse: 75   SpO2: 100%   Weight: 59.9 kg (132 lb)   Height: 154.9 cm (61\")       Wt Readings from Last 3 Encounters:   12/28/23 59.9 kg (132 lb)   06/27/23 59.8 kg (131 lb 12.8 oz)   12/21/22 57.2 kg (126 lb)      BP Readings from Last 3 Encounters:   12/28/23 122/66   06/27/23 112/72   12/21/22 116/79       Body mass index is 24.94 kg/m².    BMI is within normal parameters. No other follow-up for BMI required.       Physical " "Exam  Vitals reviewed.   Constitutional:       General: She is not in acute distress.     Appearance: Normal appearance. She is well-developed.   Cardiovascular:      Rate and Rhythm: Normal rate and regular rhythm.      Heart sounds: Normal heart sounds.   Pulmonary:      Effort: Pulmonary effort is normal.      Breath sounds: Normal breath sounds.   Musculoskeletal:      Right lower leg: No edema.      Left lower leg: No edema.   Skin:     General: Skin is warm and dry.   Neurological:      General: No focal deficit present.      Mental Status: She is alert.   Psychiatric:         Attention and Perception: Attention normal.         Mood and Affect: Mood and affect normal.         Behavior: Behavior normal.      Comments: Mild flight of ideas           Current Outpatient Medications:     busPIRone (BUSPAR) 5 MG tablet, Take 1 tablet by mouth 2 (Two) Times a Day., Disp: 180 tablet, Rfl: 0    escitalopram (LEXAPRO) 20 MG tablet, Take 1 tablet by mouth Daily, Disp: 90 tablet, Rfl: 0    QUEtiapine (SEROquel) 25 MG tablet, Take 1/2 tablet by mouth every night at bedtime., Disp: 15 tablet, Rfl: 2   Past Medical History:   Diagnosis Date    Anxiety     Depression      Allergies   Allergen Reactions    Eggs Or Egg-Derived Products GI Intolerance    Hydrocodone Rash and Headache     \"stomach pain\"               Result Review :     Common labs          1/5/2023    12:06 11/15/2023    13:37   Common Labs   Glucose  104    BUN  11    Creatinine  0.90    Sodium  141    Potassium  3.8    Chloride  109    Calcium  9.6    Albumin  4.9    Total Bilirubin  0.6    Alkaline Phosphatase  66    AST (SGOT)  13    ALT (SGPT)  18    WBC 5.70  5.72    Hemoglobin 14.6  15.0    Hematocrit 44.8  47.1    Platelets 233  217    Total Cholesterol  215    Triglycerides  77    HDL Cholesterol  58    LDL Cholesterol   143         No Images in the past 120 days found..           Social History     Tobacco Use   Smoking Status Never   Smokeless Tobacco " Never           Assessment and Plan    Diagnoses and all orders for this visit:    1. Anxiety with depression (Primary)  Assessment & Plan:  Explained to patient that she must make it a priority to have a behavioral health evaluation to rule out concerns for attention deficit disorder and my concerns that she may have a bipolar tendency.  I am adding a very low-dose mood stabilizer to take at bedtime as she is having difficulty with sleep and I am concerned about underlying bipolar tendency.  Encourage patient to set an alarm on her phone to remind her to take her medication.  Patient promises me that she will see behavioral health for evaluation and will schedule counseling.  She is to go to the emergency room for any worsening symptoms.    Orders:  -     Ambulatory Referral to Psychiatry  -     escitalopram (LEXAPRO) 20 MG tablet; Take 1 tablet by mouth Daily  Dispense: 90 tablet; Refill: 0  -     busPIRone (BUSPAR) 5 MG tablet; Take 1 tablet by mouth 2 (Two) Times a Day.  Dispense: 180 tablet; Refill: 0  -     QUEtiapine (SEROquel) 25 MG tablet; Take 1/2 tablet by mouth every night at bedtime.  Dispense: 15 tablet; Refill: 2    2. Mixed hyperlipidemia  Assessment & Plan:  Was not fasting at time of lab collection.  Decrease intake of cholesterol, saturated fat, and fried foods and monitor.  Handout on ways to prevent high cholesterol is provided to the patient.          Follow Up    No follow-ups on file.  Patient was given instructions and counseling regarding her condition or for health maintenance advice. Please see specific information pulled into the AVS if appropriate.

## 2023-12-28 NOTE — ASSESSMENT & PLAN NOTE
Explained to patient that she must make it a priority to have a behavioral health evaluation to rule out concerns for attention deficit disorder and my concerns that she may have a bipolar tendency.  I am adding a very low-dose mood stabilizer to take at bedtime as she is having difficulty with sleep and I am concerned about underlying bipolar tendency.  Encourage patient to set an alarm on her phone to remind her to take her medication.  Patient promises me that she will see behavioral health for evaluation and will schedule counseling.  She is to go to the emergency room for any worsening symptoms.

## 2024-01-05 ENCOUNTER — TELEPHONE (OUTPATIENT)
Dept: BEHAVIORAL HEALTH | Facility: CLINIC | Age: 24
End: 2024-01-05
Payer: COMMERCIAL

## 2024-01-05 NOTE — TELEPHONE ENCOUNTER
Called LM for patient to call back to reschedule appointment patient has not been seen in over a year and is requiring a 40minute time slot.  Hub scheduled patient for a 20minute slot.

## 2024-01-15 ENCOUNTER — OFFICE VISIT (OUTPATIENT)
Dept: BEHAVIORAL HEALTH | Facility: CLINIC | Age: 24
End: 2024-01-15
Payer: COMMERCIAL

## 2024-01-15 VITALS
BODY MASS INDEX: 24.55 KG/M2 | HEIGHT: 61 IN | WEIGHT: 130 LBS | DIASTOLIC BLOOD PRESSURE: 80 MMHG | HEART RATE: 93 BPM | SYSTOLIC BLOOD PRESSURE: 120 MMHG

## 2024-01-15 DIAGNOSIS — R41.840 ATTENTION AND CONCENTRATION DEFICIT: ICD-10-CM

## 2024-01-15 DIAGNOSIS — F33.1 MAJOR DEPRESSIVE DISORDER, RECURRENT EPISODE, MODERATE: ICD-10-CM

## 2024-01-15 DIAGNOSIS — F41.1 GENERALIZED ANXIETY DISORDER: Primary | ICD-10-CM

## 2024-01-15 DIAGNOSIS — Z56.6 STRESS AT WORK: ICD-10-CM

## 2024-01-15 PROCEDURE — 99215 OFFICE O/P EST HI 40 MIN: CPT | Performed by: NURSE PRACTITIONER

## 2024-01-15 PROCEDURE — 99417 PROLNG OP E/M EACH 15 MIN: CPT | Performed by: NURSE PRACTITIONER

## 2024-01-15 RX ORDER — BUPROPION HYDROCHLORIDE 150 MG/1
150 TABLET ORAL EVERY MORNING
Qty: 30 TABLET | Refills: 1 | Status: SHIPPED | OUTPATIENT
Start: 2024-01-15

## 2024-01-15 SDOH — HEALTH STABILITY - MENTAL HEALTH: OTHER PHYSICAL AND MENTAL STRAIN RELATED TO WORK: Z56.6

## 2024-01-15 NOTE — PROGRESS NOTES
"Subjective   Chon Lopez is a 23 y.o. female who presents today for follow up    Referring Provider:  Kathy Toepte, APRN  [id number] [facility]  [address]  [place] [address]    Chief Complaint:  anxiety, depression, attention & concentration deficit, stress at work    History of Present Illness:    1/15/24:  Patient presents today in office. Endorses to high anxiety and stress.   In regards to suicidal thoughts per PHQ 9 questionnaire, patient denies rumination, plans, or even thoughts of harming self, however, due to high levels of stress, feeling excessively overwhelmed at work has an indifferent mood.   \"If something bad happens to me, if I fall in the shower, or if something hits me its me, if I go to bed and I don't wake up. If it happens it happens.\"     Current medications are Lexapro 20 mg daily, Buspar 5 mg twice daily, and Seroquel 12.5 mg (started 12/28/23 per PCP) for which was started due to poor sleep and mood. 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  Patient has not scheduled with a therapist nor been able to schedule for ADHD testing as referred in 12/2021. Patient did try to go to [facility] though had limited availability.    Patient reports PCP wanted her to get tested for ADHD, and overall   \"I need a gripping sock vacation.\" As patient was referring to an inpatient stay, though was laughing, and expressed again feeling very overwhelmed.     \"I am in a downward spiral, I literally cry all the time, I call my best friend sobbing, I am overworked, employees disrespect me, my manager dumps so mucyh on me, I don't know where to start, my anxiety is through the roof. I constantly worry and stare because I am overwhelmed all the time.\"  Patient was promoted a few months ago to , and  adds tasks while patient is \"drowning, I can hear her laughing at tic toks, I ask for clarification and she gets mad at me, I am doing 3 jobs at once " "and no help.\" GM is planning to move to [place] and patient fears she will have to take on that role.  Patient is a full time student as well.  Patient lacks motivation when she gets home from work, \"I am so overwhelmed.\"     \"If a minor inconvenience happens to me I can have a break.\"   Patient feels GM is aware of mental state, being anxious, though \"she is all over the place, and if she doesn't get it done, oh Illanda will handle it.\"  Expresses fear of disappointing manager.   Patient denies training for  due to short staff. Patient has been full time on the , rather than learning new role, patient did mention she is required to work 2 days/ 20 hrs at the  per week, and new role is salary, which is also frustrating due to time spent at work.     Patient reports Seroquel was started for sleep by PCP, \"I never slept so good in my life.\"  Denies next day grogginess or difficulty arousing. Denies signs and symptoms of EPS. Reports being restless, moving mouth, biting lip as chronic reaction to stress and anxiety.   Patient has an alarm for 1 pm and takes Lexapro. Due to fluctuating schedule of working various shifts. Bedtime has been 2 am, if working 2nd shift gets off at 10 pm, if first shift starts at 6am-2 pm.     Cooper Suicide Severity Rating (C-SSRS)  In the past month, have you wished you were dead or wished you could go to sleep and not wake up? yes     In the past month, have you actually had any thoughts of killing yourself? no     Have you been thinking about how you might do this?       Have you had these thoughts and had some intention of acting on them?       Have you started to work out or have you worked out the details of how to kill yourself?       Have you ever in your lifetime done anything, started to do anything, or prepared to do anything to end your life? no       Was this within the past three months?       Level of Risk per Screen low risk    " "    Depression: Patient complains of depression. She complains of anhedonia, depressed mood, difficulty concentrating, fatigue, feelings of worthlessness/guilt, hopelessness, insomnia, and restlessness  Anxiety:  The patient endorses significant symptoms of anxiety including: excessive anxiety and worry about a number of events or activities for more days than not, restlessness or feeling keyed up, being easily fatigued, difficulty concentrating or mind going blank, irritability, and sleep disturbance which have caused impairment in important areas of daily functioning.  The patient has had symptoms of anxiety for several years, which have worsened over the last few months.        1/25/22:   Patient presents to office today for follow up since increase in Lexapro and change of Buspar to routine.   Admits to having problems with taking the Buspar twice day, always remembers taking at night with the Lexapro.  Does try to take prior to work at 1 pm.   Admits to having difficulty with sleeping which has worsen. Sleeps approximately 5 hrs without night time awakenings, difficulty with sleep latency. Patient describes laying starring at ceiling, denies use of TV or cell phone, though puts cell phone away when ready to sleep.  Problems with sleep latency began after dose increase from 10 to 20 mg on 12/28/21.   Patient reports difficulty concentrating has improved along with depressive and anxiety symptoms.   Admits to having passive thoughts or statements after a minor incident as patient states, \"I will say I wanna kill myself, but I don't\" while laughing.    Has not been able to reach out to therapist or neuropsychological adhd testing due to working over time as several employees have been out with COVID.     12/28/21: INITIAL EVAL  Patient with a history of anxiety and depression beginning treatment 2/2020.  History of self harm behavior by cutting thighs with razor blade in 8 th & 9 th grade, denies current behavior " "as last occurred in 9th grade.   Reports not having many friends growing up, attended [employer] from 1st thru 8th grade.  Describes environment as clique and rich families. Upon starting high school did not know anyone as only a few students from [employer] attended new school.     Currently takes Buspar as needed which is once every few weeks, denies side effects of dizziness, sedation, or grogginess.     Appetite has always been bad, as patient explains eating 1-2 times per day and some snacking. Able to maintain weight without drastic changes.  Admits to problem with weight gain, as teenager would eat once a day, \"starved myself\", denies self induced vomiting, excessive exercise or binge eating.     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 Sleeps soundly without night time awakenings.  Very rare due to having to use the bathroom.  Admits to daytime fatigue, denies naps, low energy, working at  not doing any extensive hard work to make self tired.     Admits to overly concentrating on one thing, then gives up, explains as intermittent, often has to force self to do things.  Will give one thing priority over other things, if not finished at that time will completely forget about it at a later time.  Admits to losing items frequently, keeps keys in one place in purse and does not touch as car is self starter and keeps on lanyard.  At times will have bursts of energy as\" the last straw in regards to cleaning room\".  Growing up in school did have difficulty with math, in English would have vocabulary sheet to help with spelling as patient had difficulty with spelling, would do testing with a learning counselor to prevent distraction from other students.  In fourth &  fifth grade had to do reading with a group of 5 students, then in middle school started having a separate area for " "testing.     Sister and mother both have diagnosis of ADD, sister was diagnosed in middle school and was treated with medications which caused \"zombie\" like behavior.  Believes mother is no longer on medications and sister is not taking any medications for ADD either.  Patient denies testing for ADHD.     Admits to someday's are more productive, occasional experiences of distractibility, and forgets what task was initially intended.  Denies keeping a list or planner, has tried to use a planner and wants to use more.  ADHD 6 question screener with varied answers of often to sometimes to very often.  Most impairing symptoms are depression and anxiety today.        PHQ-9 Depression Screening  PHQ-9 Total Score:   1/15/2024 26     Little interest or pleasure in doing things? 2-->more than half the days   Feeling down, depressed, or hopeless? 3-->nearly every day   Trouble falling or staying asleep, or sleeping too much? 3-->nearly every day   Feeling tired or having little energy? 3-->nearly every day   Poor appetite or overeating? 3-->nearly every day   Feeling bad about yourself - or that you are a failure or have let yourself or your family down? 3-->nearly every day   Trouble concentrating on things, such as reading the newspaper or watching television? 3-->nearly every day   Moving or speaking so slowly that other people could have noticed? Or the opposite - being so fidgety or restless that you have been moving around a lot more than usual? 3-->nearly every day   Thoughts that you would be better off dead, or of hurting yourself in some way? 3-->nearly every day   PHQ-9 Total Score 26   If you checked off any problems, how difficult have these problems made it for you to do your work, take care of things at home, or get along with other people? extremely difficult        KIMBERLEE-7  Feeling nervous, anxious or on edge: Nearly every day  Not being able to stop or control worrying: Nearly every day  Worrying too much " "about different things: Nearly every day  Trouble Relaxing: Nearly every day  Being so restless that it is hard to sit still: Nearly every day  Feeling afraid as if something awful might happen: Several days  Becoming easily annoyed or irritable: Nearly every day  KIMBERLEE 7 Total Score: 19  If you checked any problems, how difficult have these problems made it for you to do your work, take care of things at home, or get along with other people: Extremely difficult 1/15/2024 26    Past Surgical History:  Past Surgical History:   Procedure Laterality Date    LASIK      WISDOM TOOTH EXTRACTION         Problem List:  Patient Active Problem List   Diagnosis    Anxiety with depression    Mixed hyperlipidemia       Allergy:   Allergies   Allergen Reactions    Eggs Or Egg-Derived Products GI Intolerance    Hydrocodone Rash and Headache     \"stomach pain\"        Discontinued Medications:  There are no discontinued medications.    Current Medications:   Current Outpatient Medications   Medication Sig Dispense Refill    busPIRone (BUSPAR) 5 MG tablet Take 1 tablet by mouth 2 (Two) Times a Day. 180 tablet 0    escitalopram (LEXAPRO) 20 MG tablet Take 1 tablet by mouth Daily 90 tablet 0    QUEtiapine (SEROquel) 25 MG tablet Take 1/2 tablet by mouth every night at bedtime. 15 tablet 2    buPROPion XL (Wellbutrin XL) 150 MG 24 hr tablet Take 1 tablet by mouth Every Morning. 30 tablet 1     No current facility-administered medications for this visit.       Past Medical History:  Past Medical History:   Diagnosis Date    Anxiety     Depression        Past Psychiatric History:  Began Treatment: 2020  Diagnoses:Depression and Anxiety  Psychiatrist:Denies  Therapist: middle school age only  Admission History:Denies  Medication Trials: Zoloft 2020-2020 somewhat effective only 25%    Self Harm:  middle school- cut thighs with razor blade, 8th grade & 9th grade, would also pull out hair  Suicide Attempts:Denies   Psychosis, " Anxiety, Depression: Denies    Substance Abuse History:   Types:Denies all, including illicit  Withdrawal Symptoms:Denies  Longest Period Sober:Not Applicable   AA: Not applicable     Social History: Updated 1/15/24  Martial Status:Single  Employed:Yes and If so, where [employer] and [employer]-     Kids:No  House:Lives in a house with parents and sister   History:  [employer] briefly kicked out of bootcamp  Access to Guns:  no    Social History     Socioeconomic History    Marital status: Single   Tobacco Use    Smoking status: Never    Smokeless tobacco: Never   Vaping Use    Vaping Use: Never used   Substance and Sexual Activity    Alcohol use: Yes     Comment: 2-3 glasses of wine every 2-3 weeks    Drug use: Never    Sexual activity: Not Currently     Partners: Female       Family History:   Suicide Attempts: Denies  Suicide Completions:Denies      Family History   Problem Relation Age of Onset    Hyperlipidemia Maternal Grandfather     Breast cancer Paternal Grandmother     Diabetes Paternal Grandfather     Anxiety disorder Mother     ADD / ADHD Mother     ADD / ADHD Sister        Developmental History:   Born: [place]  Siblings:1 sister younger  Childhood: Denies Abuse  High School:Completed  College: some college, [employer] then [employer] education, political science  [employer] online-started 1 yr ago in 2023 studying business tourism online.    Mental Status Exam:   Hygiene:   good  Cooperation:  Cooperative  Eye Contact:  Good  Psychomotor Behavior:  Appropriate  Affect:  Appropriate  Mood: depressed and anxious   Speech:  Normal  Thought Process:  Goal directed  Thought Content:  Normal  Suicidal:  None  Homicidal:  None  Hallucinations:  None  Delusion:  None  Memory:  Intact  Orientation:  Person, Place, Time and Situation  Reliability:  good  Insight:  Good  Judgement:  Good  Impulse Control:  Good  Physical/Medical Issues:  No      Review of Systems:  Review of Systems  "  Constitutional:  Positive for fatigue.   HENT:  Negative for drooling, sore throat and trouble swallowing.    Respiratory:  Negative for cough and shortness of breath.    Cardiovascular:  Negative for chest pain and palpitations.   Genitourinary:  Negative for difficulty urinating and menstrual problem.   Musculoskeletal: Negative.    Neurological:  Negative for tremors and seizures.   Psychiatric/Behavioral:  Positive for agitation and decreased concentration. Negative for hallucinations, self-injury, sleep disturbance and suicidal ideas. The patient is nervous/anxious. The patient is not hyperactive.         Irritability         Physical Exam:  Physical Exam  Psychiatric:         Attention and Perception: Attention and perception normal.         Mood and Affect: Mood is anxious and depressed.         Speech: Speech normal.         Behavior: Behavior normal. Behavior is cooperative.         Thought Content: Thought content normal. Thought content does not include suicidal ideation. Thought content does not include suicidal plan.         Cognition and Memory: Cognition and memory normal.         Judgment: Judgment normal.      Comments: Indifferent, laughs, suspect use of humor to cope with stressors         Vital Signs:   /80   Pulse 93   Ht 154.9 cm (61\")   Wt 59 kg (130 lb)   BMI 24.56 kg/m²      Office notes from care team reviewed:  Date of Service: 12/28/23 and 6/27/23 [facility] [place] [facility]       Lab Results: Reviewed  Lab on 11/15/2023   Component Date Value Ref Range Status    Color, UA 11/15/2023 Yellow  Yellow, Straw Final    Appearance, UA 11/15/2023 Clear  Clear Final    pH, UA 11/15/2023 6.5  5.0 - 8.0 Final    Specific Gravity, UA 11/15/2023 >=1.030  1.005 - 1.030 Final    Glucose, UA 11/15/2023 Negative  Negative Final    Ketones, UA 11/15/2023 Negative  Negative Final    Bilirubin, UA 11/15/2023 Negative  Negative Final    Blood, UA 11/15/2023 Negative  Negative Final    " Protein, UA 11/15/2023 Negative  Negative Final    Leuk Esterase, UA 11/15/2023 Negative  Negative Final    Nitrite, UA 11/15/2023 Negative  Negative Final    Urobilinogen, UA 11/15/2023 0.2 E.U./dL  0.2 - 1.0 E.U./dL Final    C-Reactive Protein 11/15/2023 <0.30  0.00 - 0.50 mg/dL Final    Glucose 11/15/2023 104 (H)  65 - 99 mg/dL Final    BUN 11/15/2023 11  6 - 20 mg/dL Final    Creatinine 11/15/2023 0.90  0.57 - 1.00 mg/dL Final    Sodium 11/15/2023 141  136 - 145 mmol/L Final    Potassium 11/15/2023 3.8  3.5 - 5.2 mmol/L Final    Chloride 11/15/2023 109 (H)  98 - 107 mmol/L Final    CO2 11/15/2023 25.0  22.0 - 29.0 mmol/L Final    Calcium 11/15/2023 9.6  8.6 - 10.5 mg/dL Final    Total Protein 11/15/2023 6.8  6.0 - 8.5 g/dL Final    Albumin 11/15/2023 4.9  3.5 - 5.2 g/dL Final    ALT (SGPT) 11/15/2023 18  1 - 33 U/L Final    AST (SGOT) 11/15/2023 13  1 - 32 U/L Final    Alkaline Phosphatase 11/15/2023 66  39 - 117 U/L Final    Total Bilirubin 11/15/2023 0.6  0.0 - 1.2 mg/dL Final    Globulin 11/15/2023 1.9  gm/dL Final    A/G Ratio 11/15/2023 2.6  g/dL Final    BUN/Creatinine Ratio 11/15/2023 12.2  7.0 - 25.0 Final    Anion Gap 11/15/2023 7.0  5.0 - 15.0 mmol/L Final    eGFR 11/15/2023 91.2  >60.0 mL/min/1.73 Final    Sed Rate 11/15/2023 1  0 - 20 mm/hr Final    Total Cholesterol 11/15/2023 215 (H)  0 - 200 mg/dL Final    Triglycerides 11/15/2023 77  0 - 150 mg/dL Final    HDL Cholesterol 11/15/2023 58  40 - 60 mg/dL Final    LDL Cholesterol  11/15/2023 143 (H)  0 - 100 mg/dL Final    VLDL Cholesterol 11/15/2023 14  5 - 40 mg/dL Final    LDL/HDL Ratio 11/15/2023 2.44   Final    RADHA Direct 11/15/2023 Negative  Negative Final    Rheumatoid Factor Quantitative 11/15/2023 12.0  0.0 - 14.0 IU/mL Final    ASO 11/15/2023 <20.0  0.0 - 200.0 IU/mL Final    WBC 11/15/2023 5.72  3.40 - 10.80 10*3/mm3 Final    RBC 11/15/2023 5.24  3.77 - 5.28 10*6/mm3 Final    Hemoglobin 11/15/2023 15.0  12.0 - 15.9 g/dL Final    Hematocrit  11/15/2023 47.1 (H)  34.0 - 46.6 % Final    MCV 11/15/2023 89.9  79.0 - 97.0 fL Final    MCH 11/15/2023 28.6  26.6 - 33.0 pg Final    MCHC 11/15/2023 31.8  31.5 - 35.7 g/dL Final    RDW 11/15/2023 12.3  12.3 - 15.4 % Final    RDW-SD 11/15/2023 41.2  37.0 - 54.0 fl Final    MPV 11/15/2023 11.5  6.0 - 12.0 fL Final    Platelets 11/15/2023 217  140 - 450 10*3/mm3 Final    Neutrophil % 11/15/2023 61.8  42.7 - 76.0 % Final    Lymphocyte % 11/15/2023 30.1  19.6 - 45.3 % Final    Monocyte % 11/15/2023 5.2  5.0 - 12.0 % Final    Eosinophil % 11/15/2023 1.7  0.3 - 6.2 % Final    Basophil % 11/15/2023 0.9  0.0 - 1.5 % Final    Immature Grans % 11/15/2023 0.3  0.0 - 0.5 % Final    Neutrophils, Absolute 11/15/2023 3.53  1.70 - 7.00 10*3/mm3 Final    Lymphocytes, Absolute 11/15/2023 1.72  0.70 - 3.10 10*3/mm3 Final    Monocytes, Absolute 11/15/2023 0.30  0.10 - 0.90 10*3/mm3 Final    Eosinophils, Absolute 11/15/2023 0.10  0.00 - 0.40 10*3/mm3 Final    Basophils, Absolute 11/15/2023 0.05  0.00 - 0.20 10*3/mm3 Final    Immature Grans, Absolute 11/15/2023 0.02  0.00 - 0.05 10*3/mm3 Final       EKG Results:  No orders to display       Imaging Results:  No Images in the past 120 days found..      Assessment & Plan   Diagnoses and all orders for this visit:    1. Generalized anxiety disorder (Primary)  -     Ambulatory Referral to Psychotherapy  -     buPROPion XL (Wellbutrin XL) 150 MG 24 hr tablet; Take 1 tablet by mouth Every Morning.  Dispense: 30 tablet; Refill: 1    2. Major depressive disorder, recurrent episode, moderate  -     Ambulatory Referral to Psychotherapy  -     buPROPion XL (Wellbutrin XL) 150 MG 24 hr tablet; Take 1 tablet by mouth Every Morning.  Dispense: 30 tablet; Refill: 1    3. Attention and concentration deficit  -     Ambulatory Referral to Psychotherapy  -     buPROPion XL (Wellbutrin XL) 150 MG 24 hr tablet; Take 1 tablet by mouth Every Morning.  Dispense: 30 tablet; Refill: 1    4. Stress at work  -      Ambulatory Referral to Psychotherapy          Visit Diagnoses:    ICD-10-CM ICD-9-CM   1. Generalized anxiety disorder  F41.1 300.02   2. Major depressive disorder, recurrent episode, moderate  F33.1 296.32   3. Attention and concentration deficit  R41.840 799.51   4. Stress at work  Z56.6 V62.1         PLAN:  Safety: No acute safety concerns  Therapy: Referral Made [facility] with Catalina Kothari LCSW, patient informed provider is located in [facility] office [address] [place] [address] [address]. [address]. Office will call to schedule appointment.  Patient given direct contact number to call if have not received a call to schedule within 1 week, [phone].    Risk Assessment: Risk of self-harm acutely is moderate.  Risk factors include anxiety disorder, mood disorder, history of self-harm in the past,  and recent psychosocial stressors (pandemic). Protective factors include no family history, denies access to guns/weapons, no present SI, no history of suicide attempts in the past, deniesAODA, healthcare seeking, future orientation, willingness to engage in care.  Risk of self-harm chronically is also moderate, but could be further elevated in the event of treatment noncompliance and/or AODA.  4.   Meds:    Continue Lexapro 20 mg by mouth daily, takes at 1 pm, to target depression and anxiety.  Risks, benefits, alternatives discussed with patient including GI upset, nausea, vomiting, diarrhea, theoretical decrease of seizure threshold predisposing the patient to a slightly higher seizure risk, headaches, sexual dysfunction, serotonin syndrome, sweating, and bleeding risk. After discussion of these risks and benefits, the patient voiced understanding and agreed to proceed.     Continue Buspar 5 mg by mouth twice daily to target anxiety. Absorption is affected by food, so administration with or without food should be consistent. Risks, benefits, alternatives discussed with patient including nausea, GI upset,  dizziness, mild sedation, and falls risk.  After discussion of these risks and benefits, the patient voiced understanding and agreed to proceed. Takes at 1 pm and at night.    Continue Seroquel (Quetiapine) 12.5 mg by mouth nightly to target depression, anxiety, and insomnia. (Ordered 12/28/23 per PCP)  Discussed all risks, benefits, alternatives, and side effects of Seroquel, including but not limited to GI upset, agitation, dizziness, headache, sexual dysfunction, sedation, weight gain, anticholinergic effects, cataract risk, dyslipidemia, extrapyramidal symptoms (dystonia, drug-induced parkinsonism, akathisia, tardive dyskinesia), lowering of seizure threshold, hematologic abnormalities, hyperglycemia, hypothyroidism, increased mortality in elderly patients with dementia-related psychosis, neuroleptic malignant syndrome, orthostatic hypotension, falls risk in older adults, prolonged QT interval, and temperature dysregulation. Patient instructed to avoid driving and doing other tasks or actions that require to be alert until knowing how the drug affects them. Discussed the need for patient to immediately call the office for any new or worsening symptoms, such as worsening depression; feeling nervous or restless; suicidal thoughts or actions; or other changes changes in mood or behavior, and all other concerns. Patient educated on med compliance and the risks of suddenly stopping this medication or missing doses. Patient verbalized understanding and is agreeable to taking Seroquel. Addressed all questions and concerns. No adverse effects of antipsychotic medications noted, such as EPS (Extrapyramidal side effects and TD (Tardive Dyskinesia), patient to contact provider immediately if experienced.  Patient noted moving mouth often, biting lip, and shaking leg which patient reports as long standing behavior.     Start  Wellbutrin  mg by mouth daily in the morning to target depression, anxiety, attention and  concentration. Discussed all risks, benefits, alternatives, and side effects of Bupropion including but not limited to GI upset (N/V/D, constipation), tachycardia, diaphoresis, weight loss, decreased appetite, agitation, dizziness, headache, insomnia, tremor, blurred vision, anorexia, increased blood pressure and/or heart rate, activation of iris or hypomania, CNS stimulation and neuropsychiatric effects, ocular effects, seizure risk, withdrawal syndrome following abrupt discontinuation, and activation of suicidal ideation and behavior. Discussed the need for patient to immediately call the office for any new or worsening symptoms, such as worsening depression; feeling nervous or restless; suicidal thoughts or actions; or other changes changes in mood or behavior, and all other concerns. Patient educated on medication compliance. Patient verbalized understanding and is agreeable to taking Bupropion. Addressed all questions and concerns.       5.  Labs: n/a     6.  Discussed and given patient the following education materials:  -Grounding Techniques, Cognitive distortions, 5 ways to defuse anxious thoughts, and What is Mindfulness with tips printout given to patient, provided by [employer]   -How to Stop Over thinking Tips and coping strategies print out given to patient today from [facility].      7. Suggest asking for at least 1 full day per week of training. When asked to do tasks that you have not been trained to do, when assigned, tell your manager that you have not been trained, and until you are trained you cannot do that particular task. Discussed expectations.    Symptoms of anxiety, depression, attention & concentration deficit are under fair control with current medication regimen.  Due to several work stressors, mood has declined.  Patient deemed not suicidal, is frustrated, overwhelmed with expectations from employer, and advised to ask for help, by requesting training as patient has not  "received any training thus far and has been given tasks that require training.  Suspect indifferent response to questions are a use of humor to cope with stressors.  Patient was given instructions and counseling regarding condition and for health maintenance advice. Please see specific information pulled into the AVS if appropriate.    Patient to contact provider if symptoms worsen or fail to improve.        1/25/22:  Therapy: Referral Made 12/28/21 Patient to contact provider and set up appointment.  And to contact office if unable to get an appointment scheduled. Irina Loredo LMFT  -Continue Lexapro 20 mg by mouth daily in the morning or prior to work at 12-1pm vs at night due to difficulty falling asleep to target depression and anxiety.   -Continue Buspar 5 mg by mouth twice daily routinely to target anxiety.  Encouraged to take with Lexapro in the morning or early afternoon prior to work and to take prior to bed at night.  Set alarm for reminders.    Patient instructed to start taking Lexapro 20 with Buspar 5 mg in the morning or early afternoon at 12 or 1 pm vs night time as patient having difficulty with sleep latency since change from 10 to 20 mg at last visit.  Encouraged reminders to take medications and if symptoms of difficulty falling asleep continue to notify provider.  Patient encouraged to change schedule to \"day shift\" as patient used to work night and now works 2nd shift, as patient wishes to get up earlier to complete tasks prior to going to work. Patient to schedule appointment with therapist and ADHD testing providers as patient reports still having list.   -Referral to Neuropsychological testing for ADHD,on 12/28/21, patient has not yet scheduled due to work schedule, plans to schedule.     12/28/21:  Change Lexapro from 10 mg to 20 mg by mouth daily at night per patient preference to target depression and anxiety.  Change Buspar frequency from as needed to 5 mg by mouth twice daily " routinely to target anxiety.   Referral to therapy with Irina Loredo, Patient to contact provider and set up appointment.  And to contact office if unable to get an appointment scheduled.   Referral to Neuropsychological testing for ADHD, patient has family history and symptoms beginning in elementary school.  However, denies testing, and current symptoms are questionable in regards to a confirmative diagnosis of ADHD, recommend further testing.  Patient to contact provider and set up appointment.  And to contact office if unable to get an appointment scheduled.   Dr. Duane Logan, Psychologist, MS, LPP  [address], [address]  [place] [id number] [phone]   Currently only accepting referrals for psychological testing services.  Accepts Most insurance plans except Medicare Plans    Other facilities listed and printed for patient under patient instructions as testing availability may be limited.       Patient screened positive for depression based on a PHQ-9 score of 26 on 1/15/2024. Follow-up recommendations include: Prescribed antidepressant medication treatment, Referral to Social Work, and Suicide Risk Assessment performed.       TREATMENT PLAN/GOALS: Continue supportive psychotherapy efforts and medications as indicated. Treatment and medication options discussed during today's visit. Patient acknowledged and verbally consented to continue with current treatment plan and was educated on the importance of compliance with treatment and follow-up appointments.    MEDICATION ISSUES:  CRISTA reviewed as expected.  Discussed medication options and treatment plan of prescribed medication as well as the risks, benefits, and side effects including potential falls, possible impaired driving and metabolic adversities among others. Patient is agreeable to call the office with any worsening of symptoms or onset of side effects. Patient is agreeable to call 911 or go to the nearest ER should he/she  begin having SI/HI. No medication side effects or related complaints today.     MEDS ORDERED DURING VISIT:  New Medications Ordered This Visit   Medications    buPROPion XL (Wellbutrin XL) 150 MG 24 hr tablet     Sig: Take 1 tablet by mouth Every Morning.     Dispense:  30 tablet     Refill:  1       Return in about 6 weeks (around 2/26/2024) for medication check.         I spent 55 minutes caring for Chon on this date of service. This time includes time spent by me in the following activities: preparing for the visit, reviewing tests, obtaining and/or reviewing a separately obtained history, performing a medically appropriate examination and/or evaluation, counseling and educating the patient/family/caregiver, ordering medications, tests, or procedures, referring and communicating with other health care professionals, documenting information in the medical record, care coordination, and scheduling .      This document has been electronically signed by ANIKA Goetz  January 15, 2024 12:56 EST      Part of this note may be an electronic transcription/translation of spoken language to printed text using the Dragon Dictation System.

## 2024-01-15 NOTE — PATIENT INSTRUCTIONS
"1. Should you want to get in touch with your provider, ANIKA Goetz, please contact MY Medical Assistant, Kayleigh, directly at [phone].  Recommend saving Kayleigh's direct number in phone as this is the PREFERRED & EASIEST way to get in contact with your provider.  Please leave a voice mail if you do not get an answer and she will return your call within 24 hrs. You will NOT be able to contact provider on [facility], as Behavioral Health Providers are restricted. YOU MUST CALL [phone]  If you need to speak with the on call provider after hours or on weekends, please Contact the [facility] ([phone]) and staff will be able to page the provider on call directly.     2.  In the event you need to cancel an appointment, please notify the office at least 24 hrs prior:   Contact **Kayleigh Medical Assistant at [facility] directly at [phone] or the [facility] ([phone])     3. MEDICATION REFILLS:  PLEASE CALL THE PHARMACY TO REQUEST ALL MEDICATION REFILLS or via [employer] TO ENSURE YOU ARE RECEIVING YOUR MEDICATIONS IN A TIMELY MANNER. The pharmacy or [facility] [facility] will send this request ELECTRONICALLY to the ordering provider.   IF YOU USE AN AUTOMATED SERVICE AT THE PHARMACY FOR REFILLS AND ARE TOLD THERE ARE \"NO REFILLS REMAINING\"   PLEASE CALL THE PHARMACY & SPEAK TO A LIVE PERSON TO VERIFY IT IS THE MOST UP TO DATE PRESCRIPTION ON FILE.    All new prescriptions will have a different number, therefore, if you were given refills for a medication today or at last visit it will not have the same number as the previous prescription.     4.  In the event you have personal crisis, contact the following crisis numbers: Suicide Prevention Hotline 1-531.719.8090 or *988, [facility] Helpline 7-778-474-HBIG; [facility] Emergency Room [phone]; text HELLO to 784553; or 911.  If you feel like harming yourself or others, call 911 right away.  You can call the 985 Suicide and Crisis " "Lifeline at  988   to speak with a counselor at the [facility], or you can connect with one using their online chat  .    5.  Never stop an antidepressant medicine without first talking to a healthcare provider. Suddenly stopping this type of medication can cause withdrawal symptoms.    6.  Counseling and talk therapy  Counseling or therapy teaches you new coping skills and more adaptive ways of thinking about problems. These tools can help you make positive changes. The benefits of counseling often last long after treatment sessions have stopped.    7.   We would appreciate your feedback, please scan the QRS code on the back of your appointment card (or see below) and complete a brief survey.  [place] location is still not available, so please click \"[place]\" location.  Thank you      SPECIFIC RECOMMENDATIONS:     1.      Medications discussed at this encounter:                   -  Start Wellbutrin  mg by mouth daily.    2.      Psychotherapy recommendations: [facility] with Catalina Kothari LCSW, patient informed provider is located in [facility] office [address] [place] [address] [address]. [address]. Office will call to schedule appointment.  Patient given direct contact number to call if have not received a call to schedule within 1 week, [phone].      3.     Return to clinic: 6 weeks, Monday 2/26/24 at 1140 am     Coping mechanisms discussed with patient today as a way to gain control over feelings to prevent situation or panic attack from getting worse: grounding techniques using 5 senses (name 3 things you can see, smell, touch, etc.), slow paced breathing techniques- focus on door inhaling and exhaling at each corner, application of cold compress/ice pack/water on face or opening freezer door to allow cold air to be breathed in taking slow deep breaths, closing eyes and focus on positive thoughts.     Suggest asking for at least 1 full day per week of training. When you are asked to do " "tasks that you have  not been trained to do, when assigned, tell your manager that you have not been trained, and until you are trained you cannot do that particular task in a nice way.    Please arrive at least 15 minutes before your scheduled appointment time to complete check in process.      IF you are scheduled for a [facility] VIDEO visit, YOU MUST COMPLETE THE \"E-CHECK IN\" PROCESS PRIOR TO BEGINNING THE VISIT, YOU WILL NO LONGER RECEIVE A PHONE CALL PRIOR TO ALL VIDEO VISITS; You may still complete the E-Check in for in office visits prior to appointment, you will receive multiple text/email reminders which will direct you further if needed.           "

## 2024-01-18 ENCOUNTER — OFFICE VISIT (OUTPATIENT)
Dept: BEHAVIORAL HEALTH | Facility: CLINIC | Age: 24
End: 2024-01-18
Payer: COMMERCIAL

## 2024-01-18 DIAGNOSIS — R41.840 ATTENTION AND CONCENTRATION DEFICIT: ICD-10-CM

## 2024-01-18 DIAGNOSIS — F41.8 ANXIETY WITH DEPRESSION: Primary | ICD-10-CM

## 2024-01-18 NOTE — PROGRESS NOTES
"Progress Note    Date: 2024  Time In: 9:57  Time Out: 10:55    Patient Legal Name: Chon Lopez  Patient Age: 23 y.o.  Referring Provider:   Vandana Tabor Aprn  [address]  [place] [address]     CHIEF COMPLAINT: Anxiety, depression    Subjective   History of Present Illness     Chon is a 23 y.o. female presenting for initial session with this therapist. Patient complains of work being her primary stressor, reporting being assigned many tasks, having poor organizational skills, and having ineffective communication with her boss. Patient advised she has \"always\" struggled with organization, especially with work and school tasks. She states she often feels \"overwhelmed\" at work.  Patient stated PCP has suggested she could have ADHD as it runs in her family. Patient has hx of cutting during middle school and disclosed having a brief relapse in , otherwise \"I've been good.\" Patient reports currently utilizing effective coping strategies to manage stress/anxiety, such as reading, listening to music, calling a friend, and playing video games. She has a hx of panic attacks and reports averaging one per month at this time. Patient is voluntarily requesting to participate in outpatient therapy at [facility].      History obtained from referring provider's note on 1/15/24:  Past Psychiatric History:  Began Treatment: 2020  Diagnoses:Depression and Anxiety  Psychiatrist:Denies  Therapist: middle school age only  Admission History:Denies  Medication Trials: Zoloft 2020-2020 somewhat effective only 25%    Self Harm:  middle school- cut thighs with razor blade, 8th grade & 9th grade, would also pull out hair  Suicide Attempts:Denies   Psychosis, Anxiety, Depression: Denies      Assessment    Mental Status Exam     Appearance: good hygiene and dressed inappropriately for the weather  Behavior: calm  Cooperation:  engaged, cooperative, attentive, and " friendly  Eye Contact:  good  Affect:  bright  Mood: depressed and anxious  Speech: responsive and talkative  Thought Process:  organized  Thought Content: appropriate  Suicidal: denies  Homicidal:  denies  Hallucinations:  denies  Memory:  intact  Orientation:  person, place, time, and situation  Reliability:  reliable  Insight:  good  Judgment:  good   Clinical Intervention       ICD-10-CM ICD-9-CM   1. Anxiety with depression  F41.8 300.4   2. Attention and concentration deficit  R41.840 799.51        Individual psychotherapy was provided utilizing CBT, SFT, and person-centered techniques to provide symptom relief, build rapport, encourage expression of thoughts and feelings, support self-esteem, establish new coping skills, identify goals for treatment, increase acceptance, assess symptoms, gather history, provide support, establish therapeutic alliance, and provide psychoeducation. Therapist utilized open-ended questions to encourage the development of a positive therapeutic relationship and open communication.  Therapist normalized/validated patient’s thoughts and feelings as appropriate. Patient rated anxiety at 7 and depression at 8 on 0-1- scale where 0=no symptoms. Provided psychoeducation regarding fundamental CBT concepts. Gave patient handouts regarding grounding techniques, circles of control, the Cognitive Triangle, and affirmations and explained content. Discussed use of lists to assist with organization and patient agreed to get a planner for work and school tasks. Taught coping skills for managing anxiety and discussed use of problem-solving in reducing anxiety.       Plan   Plan & Goals     Continue building rapport and psychoeducation about CBT and follow up on use of coping techniques discussed today.     Patient acknowledged and verbally consented to continue working toward resolving current treatment plan goals and was educated on the importance of participation in the therapeutic  process.  Patient will remain compliant with medication regimen as prescribed. Discuss any medication side effects, questions or concerns with prescribing provider.  Call 911 or present to the nearest emergency room in an emergency situation.   National Suicide Prevention Lifeline: Call 988. The Lifeline provides 24/7, free and confidential support for people in distress, prevention and crisis resources.  Crisis Text Line  Text HOME To 165792    Return in about 2 weeks (around 2/1/2024).    ____________________  This document has been electronically signed by Catalina Kothari LCSW  January 18, 2024 11:39 EST    Part of this note may be an electronic transcription/translation of spoken language to printed text using the Dragon Dictation System.

## 2024-01-29 ENCOUNTER — OFFICE VISIT (OUTPATIENT)
Dept: BEHAVIORAL HEALTH | Facility: CLINIC | Age: 24
End: 2024-01-29
Payer: COMMERCIAL

## 2024-01-29 DIAGNOSIS — R41.840 ATTENTION AND CONCENTRATION DEFICIT: ICD-10-CM

## 2024-01-29 DIAGNOSIS — F41.8 ANXIETY WITH DEPRESSION: Primary | ICD-10-CM

## 2024-01-29 PROCEDURE — 90837 PSYTX W PT 60 MINUTES: CPT | Performed by: SOCIAL WORKER

## 2024-01-29 NOTE — PROGRESS NOTES
"Progress Note    Date: 2024  Time In: 2:00  Time Out: 2:54    Patient Legal Name: Chon Lopez  Patient Age: 23 y.o.    Mode of visit: In person  Location of provider: [address], [place] [address]  Location of patient: Office      CHIEF COMPLAINT:  Anxiety, depression     Subjective   History of Present Illness   Chon is a 23 y.o. female who presents today as a follow-up for continued psychotherapy. Patient reported she did get a planner, has been writing down her lists, and has found this to be helpful. Patient advised she still struggles with distraction/focus issues and it was recommended she discuss with Vandana. Patient reported she becomes overwhelmed with trying to clean her room. Patient disclosed \"I have a spending problem,\" noting she spends a lot of money on [employer] paraphernalia but was able to acknowledge that it would benefit her to save some of her money so she can move out. She reported she is doing well in her classes and is passing everything but struggles with math of any kind. She reported being unable to review handouts from last session. Patient is voluntarily requesting to participate in outpatient therapy at [facility].      History obtained from referring provider's note on 1/15/24:  Past Psychiatric History:  Began Treatment: 2020  Diagnoses:Depression and Anxiety  Psychiatrist:[facility]  Therapist: middle school age only  Admission History:Denies  Medication Trials: Zoloft 2020-2020 somewhat effective only 25%    Self Harm:  middle school- cut thighs with razor blade, 8th grade & 9th grade, would also pull out hair  Suicide Attempts:Denies   Psychosis, Anxiety, Depression: Denies    Assessment    Mental Status Exam     Appearance: good hygiene and dressed appropriately for the weather  Behavior: restless  Cooperation:  engaged, cooperative, attentive, and friendly  Eye Contact:  good  Affect:  bright  Mood: expressive  Speech: " responsive and talkative  Thought Process:  organized  Thought Content: appropriate  Suicidal: denies  Homicidal:  denies  Hallucinations:  denies  Memory:  intact  Orientation:  person, place, time, and situation  Reliability:  reliable  Insight:  fair  Judgment:  fair    Clinical Intervention       ICD-10-CM ICD-9-CM   1. Anxiety with depression  F41.8 300.4   2. Attention and concentration deficit  R41.840 799.51        Individual psychotherapy was provided utilizing CBT, SFT, and person-centered techniques to build rapport, encourage expression of thoughts and feelings, identify goals for treatment, identify strengths, build confidence, assess symptoms, gather history, and provide support. Therapist utilized open-ended questions to encourage the development of a positive therapeutic relationship and open communication.  Therapist normalized/validated patient’s thoughts and feelings as appropriate. Patient rated both anxiety and depression at 5 today on a 0-10 scale where 0=no symptoms. Therapist encouraged patient to start thinking about her future and what she sees herself doing after graduation. Suggested patient start putting some money in savings for her future.     Plan   Plan & Goals     Continue building rapport and follow up on patient's thoughts about the future. Also, follow up on previous handouts.    Patient acknowledged and verbally consented to continue working toward resolving current treatment plan goals and was educated on the importance of participation in the therapeutic process.  Patient will remain compliant with medication regimen as prescribed. Discuss any medication side effects, questions or concerns with prescribing provider.  Call 911 or present to the nearest emergency room in an emergency situation.  National Suicide Prevention Lifeline: Call 988. The Lifeline provides 24/7, free and confidential support for people in distress, prevention and crisis resources.  Crisis Text Line   Text HOME To 253464    Return in about 2 weeks (around 2/12/2024).    ____________________  This document has been electronically signed by Catalina Kothari LCSW  January 29, 2024 20:42 EST    Part of this note may be an electronic transcription/translation of spoken language to printed text using the Dragon Dictation System.

## 2024-02-12 ENCOUNTER — OFFICE VISIT (OUTPATIENT)
Dept: BEHAVIORAL HEALTH | Facility: CLINIC | Age: 24
End: 2024-02-12
Payer: COMMERCIAL

## 2024-02-12 DIAGNOSIS — R41.840 ATTENTION AND CONCENTRATION DEFICIT: ICD-10-CM

## 2024-02-12 DIAGNOSIS — F41.8 ANXIETY WITH DEPRESSION: Primary | ICD-10-CM

## 2024-02-12 PROCEDURE — 90837 PSYTX W PT 60 MINUTES: CPT | Performed by: SOCIAL WORKER

## 2024-02-26 ENCOUNTER — OFFICE VISIT (OUTPATIENT)
Dept: BEHAVIORAL HEALTH | Facility: CLINIC | Age: 24
End: 2024-02-26
Payer: COMMERCIAL

## 2024-02-26 VITALS
WEIGHT: 127.6 LBS | DIASTOLIC BLOOD PRESSURE: 80 MMHG | SYSTOLIC BLOOD PRESSURE: 110 MMHG | HEIGHT: 61 IN | BODY MASS INDEX: 24.09 KG/M2 | HEART RATE: 79 BPM

## 2024-02-26 DIAGNOSIS — F41.1 GENERALIZED ANXIETY DISORDER: ICD-10-CM

## 2024-02-26 DIAGNOSIS — R41.840 ATTENTION AND CONCENTRATION DEFICIT: ICD-10-CM

## 2024-02-26 DIAGNOSIS — F33.1 MAJOR DEPRESSIVE DISORDER, RECURRENT EPISODE, MODERATE: ICD-10-CM

## 2024-02-26 PROCEDURE — 99214 OFFICE O/P EST MOD 30 MIN: CPT | Performed by: NURSE PRACTITIONER

## 2024-02-26 RX ORDER — BUPROPION HYDROCHLORIDE 150 MG/1
150 TABLET ORAL EVERY MORNING
Qty: 90 TABLET | Refills: 0 | Status: SHIPPED | OUTPATIENT
Start: 2024-02-26

## 2024-02-26 NOTE — PATIENT INSTRUCTIONS
"1. Should you want to get in touch with your provider, ANIKA Goetz, please contact MY Medical Assistant, Kayleigh, directly at [phone].  Recommend saving Kayleigh's direct number in phone as this is the PREFERRED & EASIEST way to get in contact with your provider.  Please leave a voice mail if you do not get an answer and she will return your call within 24 hrs. You will NOT be able to contact provider on [employer], as Behavioral Health Providers are restricted. YOU MUST CALL [phone]  If you need to speak with the on call provider after hours or on weekends, please Contact the [facility] ([phone]) and staff will be able to page the provider on call directly.     2.  In the event you need to cancel an appointment, please notify the office at least 24 hrs prior:   Contact **Kayleigh Medical Assistant at [facility] directly at [phone] or the [facility] ([phone])     3. MEDICATION REFILLS:  PLEASE CALL THE PHARMACY TO REQUEST ALL MEDICATION REFILLS or via [facility] [employer] TO ENSURE YOU ARE RECEIVING YOUR MEDICATIONS IN A TIMELY MANNER. The pharmacy or [facility] will send this request ELECTRONICALLY to the ordering provider.   IF YOU USE AN AUTOMATED SERVICE AT THE PHARMACY FOR REFILLS AND ARE TOLD THERE ARE \"NO REFILLS REMAINING\"   PLEASE CALL THE PHARMACY & SPEAK TO A LIVE PERSON TO VERIFY IT IS THE MOST UP TO DATE PRESCRIPTION ON FILE.    All new prescriptions will have a different number, therefore, if you were given refills for a medication today or at last visit it will not have the same number as the previous prescription.     4.  In the event you have personal crisis, contact the following crisis numbers: Suicide Prevention Hotline 1-860.492.6503 or *988, NAM[facility] Helpline 8-179-734-DDRI; [facility] [phone]; text HELLO to 927654; or 911.  If you feel like harming yourself or others, call 911 right away.  You can call the 984 Suicide and Crisis " "Lifeline at  988   to speak with a counselor at the [facility], or you can connect with one using their online chat  .    5.  Never stop an antidepressant medicine without first talking to a healthcare provider. Suddenly stopping this type of medication can cause withdrawal symptoms.    6.  Counseling and talk therapy  Counseling or therapy teaches you new coping skills and more adaptive ways of thinking about problems. These tools can help you make positive changes. The benefits of counseling often last long after treatment sessions have stopped.    7.   We would appreciate your feedback, please scan the QRS code on the back of your appointment card (or see below) and complete a brief survey.  [place] location is still not available, so please click \"[place]\" location.  Thank you      SPECIFIC RECOMMENDATIONS:     1.      Medications discussed at this encounter:                   -  Refilled Wellbutrin today     2.      Psychotherapy recommendations: Continue with current therapist.     -Attached list of several other sites for Neuropsychological testing. Patient instructed to contact providers on list to determine availability of appointments, instructed patient to take notes while calling places indicating first available appointment, and to ask to be placed on waiting list.  If an office is chosen and requests the referral order please contact my Medical Assistant, Kayleigh, directly at [phone] informing of chosen office and the information will be sent.  IF YOU ARE ABLE TO GET SCHEDULED BEFORE OUR NEXT APPOINTMENT PLEASE ALLOW 2-3 WEEKS FROM THE DATE YOU WERE TESTED TO ALLOW TIME FOR THE REPORT TO BE RECEIVED, IF YOU NEED TO RESCHEDULE YOUR APPOINTMENT WITH ME PLEASE CALL TO DO SO.       3.     Return to clinic:3 months, Tues. 5/28/24 at 11 am       Neuropsychological Services    [facility]  Address: [address] [place] [address]  Phone: [phone]  Other location: [facility] " [place] [id number]. [phone]  Website: [email or web address]  Services offered: Testing and assessments for adult ADHD, intelligence/adaptive functioning, dementia and cognitive impairment. Also provides counseling and psychotherapy for anxiety, depression, trauma, grief, and more.  Insurance accepted: Aetna, [facility], [facility], [employer], [employer], [employer], Humana, Medicare, , United Healthcare/Optum Health, ValueOptions, Medicaid plans (Aetna Better Health, [place] Medicaid, Humana Medicaid, [employer], Passport, Wellcare).    [facility]  (CALL FIRST)  Address: [address] [place] [address]  Phone: [phone]  Website: [email or web address]  Services offered: Testing and assessments for adult ADHD, intelligence/adaptive functioning, brain injury, dementia and cognitive impairment. Also provides psychotherapy to individuals, couples, and families for anxiety, depression, adjustment, loss and grief, stress management, emotional distress, and more.  Insurance accepted: Most major insurance companies, including [employer], Humana, and Medicare. Not currently participating in United Health Care and do not accept Medicaid.     [facility]  Address: [address] [address]  Phone: [phone]  Website: [email or web address]  Services offered: Evaluation and testing for adults with emotional difficulties and/or ADHD, functional impairment of the brain in adults/seniors, and more. Also provides individual, group, family and marital therapy, comprehensive hypnosis services, career counseling, and neurobehavioral family therapy for brain-impaired individuals and their families.  Insurance accepted: Humana, [employer], United Health Care, [employer], and most commercial insurance. No Medicaid or Medicare.    Gisell Steele with  "[facility]  Address: [address], [place] [address] (office is rear entrance on [address]).  Phone: [phone]  Website: [email or web address]  Services offered: Psychological testing offered and also psychotherapy.   Insurance accepted: Accepts most insurance, including Medicare and Medicaid.    Duane Logan, Psychologist, MS, LPP  [address]  [place], [place] [address] [phone]      [facility]  [phone]  [place] [address]    [facility]  [address]  [place], [place] [id number] [phone]  [facility]        Please arrive at least 15 minutes before your scheduled appointment time to complete check in process.      IF you are scheduled for a [facility] visit, YOU MUST COMPLETE THE \"E-CHECK IN\" PROCESS PRIOR TO BEGINNING THE VISIT, YOU WILL NO LONGER RECEIVE A PHONE CALL PRIOR TO ALL VIDEO VISITS; You may still complete the E-Check in for in office visits prior to appointment, you will receive multiple text/email reminders which will direct you further if needed.           "

## 2024-02-26 NOTE — PROGRESS NOTES
"Subjective   Chon Lopez is a 23 y.o. female who presents today for follow up    Referring Provider:  No referring provider defined for this encounter.    Chief Complaint:  anxiety, depression, insomnia, attention & concentration deficit    History of Present Illness:  (late arrival of 1146 am for an 1120 visit)  2/26/24:  Patient presents today in office, at last visit patient was started on Wellbutrin  mg and was referred to therapy, for which patient started and is going well.  \"I am less than a nervous wreck.\" Denies side effects.   Patient continues to do well with Seroquel 12.5 mg nightly, Lexapro 20 mg, and Buspar 5 mg twice daily.  Sleeping well with Seroquel, feels anxiety and mood have improved.      Patient reports at work they have started having manager meetings, though  has been \"mosher mess, she is pregnant, and she wants to step down.\"  Patient has been able to get some training, recently hired all positions and once training completed patient can focus on new role duties.  All of management have been struggling with staff shortage, though expected to improve.  And another staff member has taken one of patient shifts at the  which has been helpful.  Implementing various things to hopefully develop a habit as part of employees routine to help overall improve work environment stress.     In regards to attention & concentration deficit, reports discussing with therapist and possible ASD was discussed due to sensory issues and struggles with math.  \"I need some accommodations when it comes to math, I have at least 2 math credits.\" Patient did have accommodations in middle school, would test in smaller group, had longer time to complete test, received a longer list of vocabulary words due to poor spelling, was in lower math classes. Denies current college accepting of accomodation until a diagnosis is determined.      1/15/24:  Patient presents today in office. " "Endorses to high anxiety and stress.   In regards to suicidal thoughts per PHQ 9 questionnaire, patient denies rumination, plans, or even thoughts of harming self, however, due to high levels of stress, feeling excessively overwhelmed at work has an indifferent mood.   \"If something bad happens to me, if I fall in the shower, or if something hits me its me, if I go to bed and I don't wake up. If it happens it happens.\"     Current medications are Lexapro 20 mg daily, Buspar 5 mg twice daily, and Seroquel 12.5 mg (started 12/28/23 per PCP) for which was started due to poor sleep and mood. 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  Patient has not scheduled with a therapist nor been able to schedule for ADHD testing as referred in 12/2021. Patient did try to go to [facility] though had limited availability.    Patient reports PCP wanted her to get tested for ADHD, and overall   \"I need a gripping sock vacation.\" As patient was referring to an inpatient stay, though was laughing, and expressed again feeling very overwhelmed.     \"I am in a downward spiral, I literally cry all the time, I call my best friend sobbing, I am overworked, employees disrespect me, my manager dumps so mucyh on me, I don't know where to start, my anxiety is through the roof. I constantly worry and stare because I am overwhelmed all the time.\"  Patient was promoted a few months ago to , and  adds tasks while patient is \"drowning, I can hear her laughing at tic toks, I ask for clarification and she gets mad at me, I am doing 3 jobs at once and no help.\" GM is planning to move to [place] and patient fears she will have to take on that role.  Patient is a full time student as well.  Patient lacks motivation when she gets home from work, \"I am so overwhelmed.\"     \"If a minor inconvenience happens to me I can have a break.\"   Patient feels GM is aware of mental state, being anxious, though " "\"she is all over the place, and if she doesn't get it done, oh Jarred will handle it.\"  Expresses fear of disappointing manager.   Patient denies training for  due to short staff. Patient has been full time on the , rather than learning new role, patient did mention she is required to work 2 days/ 20 hrs at the  per week, and new role is salary, which is also frustrating due to time spent at work.     Patient reports Seroquel was started for sleep by PCP, \"I never slept so good in my life.\"  Denies next day grogginess or difficulty arousing. Denies signs and symptoms of EPS. Reports being restless, moving mouth, biting lip as chronic reaction to stress and anxiety.   Patient has an alarm for 1 pm and takes Lexapro. Due to fluctuating schedule of working various shifts. Bedtime has been 2 am, if working 2nd shift gets off at 10 pm, if first shift starts at 6am-2 pm.     Depression: Patient complains of depression. She complains of anhedonia, depressed mood, difficulty concentrating, fatigue, feelings of worthlessness/guilt, hopelessness, insomnia, and restlessness  Anxiety:  The patient endorses significant symptoms of anxiety including: excessive anxiety and worry about a number of events or activities for more days than not, restlessness or feeling keyed up, being easily fatigued, difficulty concentrating or mind going blank, irritability, and sleep disturbance which have caused impairment in important areas of daily functioning.  The patient has had symptoms of anxiety for several years, which have worsened over the last few months.        1/25/22:   Patient presents to office today for follow up since increase in Lexapro and change of Buspar to routine.   Admits to having problems with taking the Buspar twice day, always remembers taking at night with the Lexapro.  Does try to take prior to work at 1 pm.   Admits to having difficulty with sleeping which has worsen. Sleeps " "approximately 5 hrs without night time awakenings, difficulty with sleep latency. Patient describes laying starring at ceiling, denies use of TV or cell phone, though puts cell phone away when ready to sleep.  Problems with sleep latency began after dose increase from 10 to 20 mg on 12/28/21.   Patient reports difficulty concentrating has improved along with depressive and anxiety symptoms.   Admits to having passive thoughts or statements after a minor incident as patient states, \"I will say I wanna kill myself, but I don't\" while laughing.    Has not been able to reach out to therapist or neuropsychological adhd testing due to working over time as several employees have been out with COVID.     12/28/21: INITIAL EVAL  Patient with a history of anxiety and depression beginning treatment 2/2020.  History of self harm behavior by cutting thighs with razor blade in 8 th & 9 th grade, denies current behavior as last occurred in 9th grade.   Reports not having many friends growing up, attended [employer] from 1st thru 8th grade.  Describes environment as clique and rich families. Upon starting high school did not know anyone as only a few students from [employer] school attended new school.     Currently takes Buspar as needed which is once every few weeks, denies side effects of dizziness, sedation, or grogginess.     Appetite has always been bad, as patient explains eating 1-2 times per day and some snacking. Able to maintain weight without drastic changes.  Admits to problem with weight gain, as teenager would eat once a day, \"starved myself\", denies self induced vomiting, excessive exercise or binge eating.     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 Sleeps soundly without night time awakenings.  Very rare due to having to use the bathroom.  Admits to daytime fatigue, denies naps, low energy, " "working at  not doing any extensive hard work to make self tired.     Admits to overly concentrating on one thing, then gives up, explains as intermittent, often has to force self to do things.  Will give one thing priority over other things, if not finished at that time will completely forget about it at a later time.  Admits to losing items frequently, keeps keys in one place in purse and does not touch as car is self starter and keeps on lanyard.  At times will have bursts of energy as\" the last straw in regards to cleaning room\".  Growing up in school did have difficulty with math, in English would have vocabulary sheet to help with spelling as patient had difficulty with spelling, would do testing with a learning counselor to prevent distraction from other students.  In fourth &  fifth grade had to do reading with a group of 5 students, then in middle school started having a separate area for testing.     Sister and mother both have diagnosis of ADD, sister was diagnosed in middle school and was treated with medications which caused \"zombie\" like behavior.  Believes mother is no longer on medications and sister is not taking any medications for ADD either.  Patient denies testing for ADHD.     Admits to someday's are more productive, occasional experiences of distractibility, and forgets what task was initially intended.  Denies keeping a list or planner, has tried to use a planner and wants to use more.  ADHD 6 question screener with varied answers of often to sometimes to very often.  Most impairing symptoms are depression and anxiety today.        PHQ-9 Depression Screening  PHQ-9 Total Score:   1/15/2024 26     Little interest or pleasure in doing things?     Feeling down, depressed, or hopeless?     Trouble falling or staying asleep, or sleeping too much?     Feeling tired or having little energy?     Poor appetite or overeating?     Feeling bad about yourself - or that you are a failure or have " "let yourself or your family down?     Trouble concentrating on things, such as reading the newspaper or watching television?     Moving or speaking so slowly that other people could have noticed? Or the opposite - being so fidgety or restless that you have been moving around a lot more than usual?     Thoughts that you would be better off dead, or of hurting yourself in some way?     PHQ-9 Total Score     If you checked off any problems, how difficult have these problems made it for you to do your work, take care of things at home, or get along with other people?          KIMBERLEE-7    1/15/2024 19    Past Surgical History:  Past Surgical History:   Procedure Laterality Date    LASIK      WISDOM TOOTH EXTRACTION         Problem List:  Patient Active Problem List   Diagnosis    Anxiety with depression    Mixed hyperlipidemia       Allergy:   Allergies   Allergen Reactions    Eggs Or Egg-Derived Products GI Intolerance    Hydrocodone Rash and Headache     \"stomach pain\"        Discontinued Medications:  Medications Discontinued During This Encounter   Medication Reason    buPROPion XL (Wellbutrin XL) 150 MG 24 hr tablet Reorder       Current Medications:   Current Outpatient Medications   Medication Sig Dispense Refill    buPROPion XL (Wellbutrin XL) 150 MG 24 hr tablet Take 1 tablet by mouth Every Morning. 90 tablet 0    busPIRone (BUSPAR) 5 MG tablet Take 1 tablet by mouth 2 (Two) Times a Day. 180 tablet 0    escitalopram (LEXAPRO) 20 MG tablet Take 1 tablet by mouth Daily 90 tablet 0    QUEtiapine (SEROquel) 25 MG tablet Take 1/2 tablet by mouth every night at bedtime. 15 tablet 2     No current facility-administered medications for this visit.       Past Medical History:  Past Medical History:   Diagnosis Date    Anxiety     Depression        Past Psychiatric History:  Began Treatment: 2/2020  Diagnoses:Depression and Anxiety  Psychiatrist:[facility]  Therapist: middle school age only  Admission History:Denies  Medication " Trials: Zoloft 2020-2020 somewhat effective only 25%    Self Harm:  middle school- cut thighs with razor blade, 8th grade & 9th grade, would also pull out hair  Suicide Attempts:Denies   Psychosis, Anxiety, Depression: Denies    Substance Abuse History:   Types:Denies all, including illicit  Withdrawal Symptoms:Denies  Longest Period Sober:Not Applicable   AA: Not applicable     Social History: Updated 1/15/24  Martial Status:Single  Employed:Yes and If so, where [employer]-     Kids:No  House:Lives in a house with parents and sister   History:  [employer] briefly kicked out of [employer]  Access to Guns:  no    Social History     Socioeconomic History    Marital status: Single   Tobacco Use    Smoking status: Never    Smokeless tobacco: Never   Vaping Use    Vaping Use: Never used   Substance and Sexual Activity    Alcohol use: Yes     Comment: 2-3 glasses of wine every 2-3 weeks    Drug use: Never    Sexual activity: Not Currently     Partners: Female       Family History:   Suicide Attempts: Denies  Suicide Completions:Denies      Family History   Problem Relation Age of Onset    Hyperlipidemia Maternal Grandfather     Breast cancer Paternal Grandmother     Diabetes Paternal Grandfather     Anxiety disorder Mother     ADD / ADHD Mother     ADD / ADHD Sister        Developmental History:   Born: [place]  Siblings:1 sister younger  Childhood: Denies Abuse  High School:Completed  College: some college, [employer] then [employer] education, political science  [employer] online-started 1 yr ago in  studying business tourism online.    Mental Status Exam:   Hygiene:   good  Cooperation:  Cooperative  Eye Contact:  Good  Psychomotor Behavior:  Appropriate  Affect:  Appropriate  Mood: anxious improving   Speech:  Normal  Thought Process:  Goal directed  Thought Content:  Normal  Suicidal:  None  Homicidal:  None  Hallucinations:  None  Delusion:  None  Memory:   "Intact  Orientation:  Person, Place, Time and Situation  Reliability:  good  Insight:  Good  Judgement:  Good  Impulse Control:  Good  Physical/Medical Issues:  No      Review of Systems:  Review of Systems   HENT:  Negative for drooling, sore throat and trouble swallowing.    Respiratory:  Negative for cough and shortness of breath.    Cardiovascular:  Negative for chest pain and palpitations.   Genitourinary:  Negative for difficulty urinating and menstrual problem.   Musculoskeletal: Negative.    Neurological:  Negative for tremors and seizures.   Psychiatric/Behavioral:  Positive for decreased concentration. Negative for hallucinations, self-injury, sleep disturbance and suicidal ideas. The patient is nervous/anxious. The patient is not hyperactive.          Physical Exam:  Physical Exam  Psychiatric:         Attention and Perception: Attention and perception normal.         Mood and Affect: Mood and affect normal. Mood is anxious.         Speech: Speech normal.         Behavior: Behavior normal. Behavior is cooperative.         Thought Content: Thought content normal. Thought content does not include suicidal ideation. Thought content does not include suicidal plan.         Cognition and Memory: Cognition and memory normal.         Judgment: Judgment normal.         Vital Signs:   /80   Pulse 79   Ht 154.9 cm (61\")   Wt 57.9 kg (127 lb 9.6 oz)   BMI 24.11 kg/m²      Lab Results:   Lab on 11/15/2023   Component Date Value Ref Range Status    Color, UA 11/15/2023 Yellow  Yellow, Straw Final    Appearance, UA 11/15/2023 Clear  Clear Final    pH, UA 11/15/2023 6.5  5.0 - 8.0 Final    Specific Gravity, UA 11/15/2023 >=1.030  1.005 - 1.030 Final    Glucose, UA 11/15/2023 Negative  Negative Final    Ketones, UA 11/15/2023 Negative  Negative Final    Bilirubin, UA 11/15/2023 Negative  Negative Final    Blood, UA 11/15/2023 Negative  Negative Final    Protein, UA 11/15/2023 Negative  Negative Final    Leuk " Esterase, UA 11/15/2023 Negative  Negative Final    Nitrite, UA 11/15/2023 Negative  Negative Final    Urobilinogen, UA 11/15/2023 0.2 E.U./dL  0.2 - 1.0 E.U./dL Final    C-Reactive Protein 11/15/2023 <0.30  0.00 - 0.50 mg/dL Final    Glucose 11/15/2023 104 (H)  65 - 99 mg/dL Final    BUN 11/15/2023 11  6 - 20 mg/dL Final    Creatinine 11/15/2023 0.90  0.57 - 1.00 mg/dL Final    Sodium 11/15/2023 141  136 - 145 mmol/L Final    Potassium 11/15/2023 3.8  3.5 - 5.2 mmol/L Final    Chloride 11/15/2023 109 (H)  98 - 107 mmol/L Final    CO2 11/15/2023 25.0  22.0 - 29.0 mmol/L Final    Calcium 11/15/2023 9.6  8.6 - 10.5 mg/dL Final    Total Protein 11/15/2023 6.8  6.0 - 8.5 g/dL Final    Albumin 11/15/2023 4.9  3.5 - 5.2 g/dL Final    ALT (SGPT) 11/15/2023 18  1 - 33 U/L Final    AST (SGOT) 11/15/2023 13  1 - 32 U/L Final    Alkaline Phosphatase 11/15/2023 66  39 - 117 U/L Final    Total Bilirubin 11/15/2023 0.6  0.0 - 1.2 mg/dL Final    Globulin 11/15/2023 1.9  gm/dL Final    A/G Ratio 11/15/2023 2.6  g/dL Final    BUN/Creatinine Ratio 11/15/2023 12.2  7.0 - 25.0 Final    Anion Gap 11/15/2023 7.0  5.0 - 15.0 mmol/L Final    eGFR 11/15/2023 91.2  >60.0 mL/min/1.73 Final    Sed Rate 11/15/2023 1  0 - 20 mm/hr Final    Total Cholesterol 11/15/2023 215 (H)  0 - 200 mg/dL Final    Triglycerides 11/15/2023 77  0 - 150 mg/dL Final    HDL Cholesterol 11/15/2023 58  40 - 60 mg/dL Final    LDL Cholesterol  11/15/2023 143 (H)  0 - 100 mg/dL Final    VLDL Cholesterol 11/15/2023 14  5 - 40 mg/dL Final    LDL/HDL Ratio 11/15/2023 2.44   Final    RADHA Direct 11/15/2023 Negative  Negative Final    Rheumatoid Factor Quantitative 11/15/2023 12.0  0.0 - 14.0 IU/mL Final    ASO 11/15/2023 <20.0  0.0 - 200.0 IU/mL Final    WBC 11/15/2023 5.72  3.40 - 10.80 10*3/mm3 Final    RBC 11/15/2023 5.24  3.77 - 5.28 10*6/mm3 Final    Hemoglobin 11/15/2023 15.0  12.0 - 15.9 g/dL Final    Hematocrit 11/15/2023 47.1 (H)  34.0 - 46.6 % Final    MCV  11/15/2023 89.9  79.0 - 97.0 fL Final    MCH 11/15/2023 28.6  26.6 - 33.0 pg Final    MCHC 11/15/2023 31.8  31.5 - 35.7 g/dL Final    RDW 11/15/2023 12.3  12.3 - 15.4 % Final    RDW-SD 11/15/2023 41.2  37.0 - 54.0 fl Final    MPV 11/15/2023 11.5  6.0 - 12.0 fL Final    Platelets 11/15/2023 217  140 - 450 10*3/mm3 Final    Neutrophil % 11/15/2023 61.8  42.7 - 76.0 % Final    Lymphocyte % 11/15/2023 30.1  19.6 - 45.3 % Final    Monocyte % 11/15/2023 5.2  5.0 - 12.0 % Final    Eosinophil % 11/15/2023 1.7  0.3 - 6.2 % Final    Basophil % 11/15/2023 0.9  0.0 - 1.5 % Final    Immature Grans % 11/15/2023 0.3  0.0 - 0.5 % Final    Neutrophils, Absolute 11/15/2023 3.53  1.70 - 7.00 10*3/mm3 Final    Lymphocytes, Absolute 11/15/2023 1.72  0.70 - 3.10 10*3/mm3 Final    Monocytes, Absolute 11/15/2023 0.30  0.10 - 0.90 10*3/mm3 Final    Eosinophils, Absolute 11/15/2023 0.10  0.00 - 0.40 10*3/mm3 Final    Basophils, Absolute 11/15/2023 0.05  0.00 - 0.20 10*3/mm3 Final    Immature Grans, Absolute 11/15/2023 0.02  0.00 - 0.05 10*3/mm3 Final       EKG Results:  No orders to display       Imaging Results:  No Images in the past 120 days found..      Assessment & Plan   Diagnoses and all orders for this visit:    1. Generalized anxiety disorder  -     buPROPion XL (Wellbutrin XL) 150 MG 24 hr tablet; Take 1 tablet by mouth Every Morning.  Dispense: 90 tablet; Refill: 0  -     Ambulatory Referral to Neuropsychology    2. Major depressive disorder, recurrent episode, moderate  -     buPROPion XL (Wellbutrin XL) 150 MG 24 hr tablet; Take 1 tablet by mouth Every Morning.  Dispense: 90 tablet; Refill: 0  -     Ambulatory Referral to Neuropsychology    3. Attention and concentration deficit  -     buPROPion XL (Wellbutrin XL) 150 MG 24 hr tablet; Take 1 tablet by mouth Every Morning.  Dispense: 90 tablet; Refill: 0  -     Ambulatory Referral to Neuropsychology            Visit Diagnoses:    ICD-10-CM ICD-9-CM   1. Generalized anxiety  disorder  F41.1 300.02   2. Major depressive disorder, recurrent episode, moderate  F33.1 296.32   3. Attention and concentration deficit  R41.840 799.51           PLAN:  Safety: No acute safety concerns  Therapy: Currently Seeing a Therapist [facility] with Catalina Kothari LCSW  Risk Assessment: Risk of self-harm acutely is moderate.  Risk factors include anxiety disorder, mood disorder, history of self-harm in the past,  and recent psychosocial stressors (pandemic). Protective factors include no family history, denies access to guns/weapons, no present SI, no history of suicide attempts in the past, deniesAODA, healthcare seeking, future orientation, willingness to engage in care.  Risk of self-harm chronically is also moderate, but could be further elevated in the event of treatment noncompliance and/or AODA.  4.   Meds:    Continue Lexapro 20 mg by mouth daily, takes at 1 pm, to target depression and anxiety.  Risks, benefits, alternatives discussed with patient including GI upset, nausea, vomiting, diarrhea, theoretical decrease of seizure threshold predisposing the patient to a slightly higher seizure risk, headaches, sexual dysfunction, serotonin syndrome, sweating, and bleeding risk. After discussion of these risks and benefits, the patient voiced understanding and agreed to proceed.     Continue Buspar 5 mg by mouth twice daily to target anxiety. Absorption is affected by food, so administration with or without food should be consistent. Risks, benefits, alternatives discussed with patient including nausea, GI upset, dizziness, mild sedation, and falls risk.  After discussion of these risks and benefits, the patient voiced understanding and agreed to proceed. Takes at 1 pm and at night.    Continue Seroquel (Quetiapine) 12.5 mg by mouth nightly to target depression, anxiety, and insomnia. (Ordered 12/28/23 per PCP)  Discussed all risks, benefits, alternatives, and side effects of Seroquel,  including but not limited to GI upset, agitation, dizziness, headache, sexual dysfunction, sedation, weight gain, anticholinergic effects, cataract risk, dyslipidemia, extrapyramidal symptoms (dystonia, drug-induced parkinsonism, akathisia, tardive dyskinesia), lowering of seizure threshold, hematologic abnormalities, hyperglycemia, hypothyroidism, increased mortality in elderly patients with dementia-related psychosis, neuroleptic malignant syndrome, orthostatic hypotension, falls risk in older adults, prolonged QT interval, and temperature dysregulation. Patient instructed to avoid driving and doing other tasks or actions that require to be alert until knowing how the drug affects them. Discussed the need for patient to immediately call the office for any new or worsening symptoms, such as worsening depression; feeling nervous or restless; suicidal thoughts or actions; or other changes changes in mood or behavior, and all other concerns. Patient educated on med compliance and the risks of suddenly stopping this medication or missing doses. Patient verbalized understanding and is agreeable to taking Seroquel. Addressed all questions and concerns. No adverse effects of antipsychotic medications noted, such as EPS (Extrapyramidal side effects and TD (Tardive Dyskinesia), patient to contact provider immediately if experienced.  Patient noted moving mouth often, biting lip, and shaking leg which patient reports as long standing behavior.     Continue Wellbutrin  mg by mouth daily in the morning to target depression, anxiety, attention and concentration. Discussed all risks, benefits, alternatives, and side effects of Bupropion including but not limited to GI upset (N/V/D, constipation), tachycardia, diaphoresis, weight loss, decreased appetite, agitation, dizziness, headache, insomnia, tremor, blurred vision, anorexia, increased blood pressure and/or heart rate, activation of iris or hypomania, CNS stimulation  and neuropsychiatric effects, ocular effects, seizure risk, withdrawal syndrome following abrupt discontinuation, and activation of suicidal ideation and behavior. Discussed the need for patient to immediately call the office for any new or worsening symptoms, such as worsening depression; feeling nervous or restless; suicidal thoughts or actions; or other changes changes in mood or behavior, and all other concerns. Patient educated on medication compliance. Patient verbalized understanding and is agreeable to taking Bupropion. Addressed all questions and concerns.  Refilled today for 90 days    5.  Labs: n/a     6.  Patient referred for Neuropsychological testing to determine other potential undiagnosed mental disorders TO   [facility]   [address] [place] [address]  Phone: [phone]  Website: [email or web address]  Services offered: Testing and assessments for adult ADHD, intelligence/adaptive functioning, brain injury, dementia and cognitive impairment. Also provides psychotherapy to individuals, couples, and families for anxiety, depression, adjustment, loss and grief, stress management, emotional distress, and more.  Insurance accepted: Most major insurance companies, including [employer], [employer], and Medicare. Not currently participating in [employer] and do not accept Medicaid.     Patient to contact provider and set up appointment.  And to contact office if unable to get an appointment scheduled.    -Attached list of several other sites for Neuropsychological testing. Patient instructed to contact providers on list to determine availability of appointments, instructed patient to take notes while calling places indicating first available appointment, and to ask to be placed on waiting list.  If an office is chosen and requests the referral order please contact my Medical Assistant, Kayleigh, directly at [phone] informing of chosen office and the  information will be sent.  IF YOU ARE ABLE TO GET SCHEDULED BEFORE OUR NEXT APPOINTMENT PLEASE ALLOW 2-3 WEEKS FROM THE DATE YOU WERE TESTED TO ALLOW TIME FOR THE REPORT TO BE RECEIVED, IF YOU NEED TO RESCHEDULE YOUR APPOINTMENT WITH ME PLEASE CALL TO DO SO.      Symptoms of anxiety, depression, insomnia are under good control with current medication regimen.  Attention and concentration continues to be problematic, will send for neuropsychological testing.  Patient voiced willingness and urgency to have testing completed before she turns 24 this summer and will no longer have parents insurance as patient had been referred a few yrs ago and did not schedule.   Patient was given instructions and counseling regarding condition and for health maintenance advice. Please see specific information pulled into the AVS if appropriate.    Patient to contact provider if symptoms worsen or fail to improve.        1/15/24:   Safety: No acute safety concerns  Therapy: Referral Made [facility] with Catalina Kothari LCSW, patient informed provider is located in [facility] office [address] [place] [address] [address]. Office will call to schedule appointment.  Patient given direct contact number to call if have not received a call to schedule within 1 week, [phone].      Continue Lexapro 20 mg by mouth daily, takes at 1 pm, to target depression and anxiety.  Risks, benefits, alternatives discussed with patient including GI upset, nausea, vomiting, diarrhea, theoretical decrease of seizure threshold predisposing the patient to a slightly higher seizure risk, headaches, sexual dysfunction, serotonin syndrome, sweating, and bleeding risk. After discussion of these risks and benefits, the patient voiced understanding and agreed to proceed.     Continue Buspar 5 mg by mouth twice daily to target anxiety. Absorption is affected by food, so administration with or without food should be consistent. Risks, benefits,  alternatives discussed with patient including nausea, GI upset, dizziness, mild sedation, and falls risk.  After discussion of these risks and benefits, the patient voiced understanding and agreed to proceed. Takes at 1 pm and at night.    Continue Seroquel (Quetiapine) 12.5 mg by mouth nightly to target depression, anxiety, and insomnia. (Ordered 12/28/23 per PCP)  Discussed all risks, benefits, alternatives, and side effects of Seroquel, including but not limited to GI upset, agitation, dizziness, headache, sexual dysfunction, sedation, weight gain, anticholinergic effects, cataract risk, dyslipidemia, extrapyramidal symptoms (dystonia, drug-induced parkinsonism, akathisia, tardive dyskinesia), lowering of seizure threshold, hematologic abnormalities, hyperglycemia, hypothyroidism, increased mortality in elderly patients with dementia-related psychosis, neuroleptic malignant syndrome, orthostatic hypotension, falls risk in older adults, prolonged QT interval, and temperature dysregulation. Patient instructed to avoid driving and doing other tasks or actions that require to be alert until knowing how the drug affects them. Discussed the need for patient to immediately call the office for any new or worsening symptoms, such as worsening depression; feeling nervous or restless; suicidal thoughts or actions; or other changes changes in mood or behavior, and all other concerns. Patient educated on med compliance and the risks of suddenly stopping this medication or missing doses. Patient verbalized understanding and is agreeable to taking Seroquel. Addressed all questions and concerns. No adverse effects of antipsychotic medications noted, such as EPS (Extrapyramidal side effects and TD (Tardive Dyskinesia), patient to contact provider immediately if experienced.  Patient noted moving mouth often, biting lip, and shaking leg which patient reports as long standing behavior.     Start  Wellbutrin  mg by mouth daily  in the morning to target depression, anxiety, attention and concentration. Discussed all risks, benefits, alternatives, and side effects of Bupropion including but not limited to GI upset (N/V/D, constipation), tachycardia, diaphoresis, weight loss, decreased appetite, agitation, dizziness, headache, insomnia, tremor, blurred vision, anorexia, increased blood pressure and/or heart rate, activation of iris or hypomania, CNS stimulation and neuropsychiatric effects, ocular effects, seizure risk, withdrawal syndrome following abrupt discontinuation, and activation of suicidal ideation and behavior. Discussed the need for patient to immediately call the office for any new or worsening symptoms, such as worsening depression; feeling nervous or restless; suicidal thoughts or actions; or other changes changes in mood or behavior, and all other concerns. Patient educated on medication compliance. Patient verbalized understanding and is agreeable to taking Bupropion. Addressed all questions and concerns.       Discussed and given patient the following education materials:  -Grounding Techniques, Cognitive distortions, 5 ways to defuse anxious thoughts, and What is Mindfulness with tips printout given to patient, provided by [employer]   -How to Stop Over thinking Tips and coping strategies print out given to patient today from [facility].      Suggest asking for at least 1 full day per week of training. When asked to do tasks that you have not been trained to do, when assigned, tell your manager that you have not been trained, and until you are trained you cannot do that particular task. Discussed expectations.    Symptoms of anxiety, depression, attention & concentration deficit are under fair control with current medication regimen.  Due to several work stressors, mood has declined.  Patient deemed not suicidal, is frustrated, overwhelmed with expectations from employer, and advised to ask for help, by  "requesting training as patient has not received any training thus far and has been given tasks that require training.  Suspect indifferent response to questions are a use of humor to cope with stressors.  Patient was given instructions and counseling regarding condition and for health maintenance advice. Please see specific information pulled into the AVS if appropriate.    Patient to contact provider if symptoms worsen or fail to improve.        1/25/22:  Therapy: Referral Made 12/28/21 Patient to contact provider and set up appointment.  And to contact office if unable to get an appointment scheduled. Irina Loredo LMFT  -Continue Lexapro 20 mg by mouth daily in the morning or prior to work at 12-1pm vs at night due to difficulty falling asleep to target depression and anxiety.   -Continue Buspar 5 mg by mouth twice daily routinely to target anxiety.  Encouraged to take with Lexapro in the morning or early afternoon prior to work and to take prior to bed at night.  Set alarm for reminders.    Patient instructed to start taking Lexapro 20 with Buspar 5 mg in the morning or early afternoon at 12 or 1 pm vs night time as patient having difficulty with sleep latency since change from 10 to 20 mg at last visit.  Encouraged reminders to take medications and if symptoms of difficulty falling asleep continue to notify provider.  Patient encouraged to change schedule to \"day shift\" as patient used to work night and now works 2nd shift, as patient wishes to get up earlier to complete tasks prior to going to work. Patient to schedule appointment with therapist and ADHD testing providers as patient reports still having list.   -Referral to Neuropsychological testing for ADHD,on 12/28/21, patient has not yet scheduled due to work schedule, plans to schedule.     12/28/21:  Change Lexapro from 10 mg to 20 mg by mouth daily at night per patient preference to target depression and anxiety.  Change Buspar frequency from as " needed to 5 mg by mouth twice daily routinely to target anxiety.   Referral to therapy with Irina Loredo, Patient to contact provider and set up appointment.  And to contact office if unable to get an appointment scheduled.   Referral to Neuropsychological testing for ADHD, patient has family history and symptoms beginning in elementary school.  However, denies testing, and current symptoms are questionable in regards to a confirmative diagnosis of ADHD, recommend further testing.  Patient to contact provider and set up appointment.  And to contact office if unable to get an appointment scheduled.   Dr. Duane Logan, Psychologist, MS, LPP  [address], [address]  [place], [place] [address] [phone]   Currently only accepting referrals for psychological testing services.  Accepts Most insurance plans except Medicare Plans    Other facilities listed and printed for patient under patient instructions as testing availability may be limited.       Patient screened positive for depression based on a PHQ-9 score of 26 on 1/15/2024. Follow-up recommendations include: Prescribed antidepressant medication treatment, Referral to Social Work, and Suicide Risk Assessment performed.       TREATMENT PLAN/GOALS: Continue supportive psychotherapy efforts and medications as indicated. Treatment and medication options discussed during today's visit. Patient acknowledged and verbally consented to continue with current treatment plan and was educated on the importance of compliance with treatment and follow-up appointments.    MEDICATION ISSUES:  CRISTA reviewed as expected.  Discussed medication options and treatment plan of prescribed medication as well as the risks, benefits, and side effects including potential falls, possible impaired driving and metabolic adversities among others. Patient is agreeable to call the office with any worsening of symptoms or onset of side effects. Patient is agreeable to call [phone]  or go to the nearest ER should he/she begin having SI/HI. No medication side effects or related complaints today.     MEDS ORDERED DURING VISIT:  New Medications Ordered This Visit   Medications    buPROPion XL (Wellbutrin XL) 150 MG 24 hr tablet     Sig: Take 1 tablet by mouth Every Morning.     Dispense:  90 tablet     Refill:  0       Return in about 3 months (around 5/26/2024) for medication check.         I spent 32 minutes caring for Chon on this date of service. This time includes time spent by me in the following activities: preparing for the visit, performing a medically appropriate examination and/or evaluation, counseling and educating the patient/family/caregiver, ordering medications, tests, or procedures, referring and communicating with other health care professionals, documenting information in the medical record, care coordination, and scheduling .      This document has been electronically signed by ANIKA Goetz  February 26, 2024 12:15 EST      Part of this note may be an electronic transcription/translation of spoken language to printed text using the Dragon Dictation System.

## 2024-02-26 NOTE — PROGRESS NOTES
"Progress Note    Date: 2024  Time In: 2:06  Time Out: 2:57    Patient Legal Name: Chon Lopez  Patient Age: 23 y.o.    Mode of visit: In person  Location of provider: [address], [place] [address]  Location of patient: Office      CHIEF COMPLAINT: anxiety, inattention, depression    Subjective   History of Present Illness   Chon is a 23 y.o. female who presents today as a follow-up for continued psychotherapy. Patient was running late today. Patient reported she got scammed over some photo cards but she got some of her money back but other than that she reports doing well. Patient stated Vandana referred her for ADHD testing.  Patient shared she has been productive and cleaning the house since last visit. Patient advised she used techniques learned in session, working in blocks of time with scheduled breaks. Patient stated she is excited about getting tested for ADHD.  Patient reported she failed her math class.  Patient shared her work has hired someone new and she is hopeful this will lighten her load. Patient described her sister and her dad as \"annoying.\". Patient explained that her mom is \"an angry [place] woman.\" Patient rated depression at 5 and anxiety at 7 today on a 0-10 scale where 0= no symptoms, noting the scamming and seasonal depression influenced her scores. Patient is voluntarily requesting to participate in outpatient therapy at [facility].      History obtained from referring provider's note on 1/15/24:  Past Psychiatric History:  Began Treatment: 2020  Diagnoses:Depression and Anxiety  Psychiatrist:Denies  Therapist: middle school age only  Admission History:Denies  Medication Trials: Zoloft 2020-2020 somewhat effective only 25%    Self Harm:  middle school- cut thighs with razor blade, 8th grade & 9th grade, would also pull out hair  Suicide Attempts:Denies   Psychosis, Anxiety, Depression: Denies      Assessment    Mental Status " Exam     Appearance: dressed appropriately for the weather  Behavior: hyperactive  Cooperation:  engaged, cooperative, attentive, and friendly  Eye Contact:  good  Affect:  congruent  Mood: expressive  Speech: responsive, rapid, and talkative  Thought Process:   somewhat circumstantial  Thought Content: appropriate  Suicidal: denies  Homicidal:  denies  Hallucinations:  denies  Memory:  intact  Orientation:  person, place, time, and situation  Reliability:  reliable  Insight:  fair  Judgment:  fair    Clinical Intervention       ICD-10-CM ICD-9-CM   1. Anxiety with depression  F41.8 300.4   2. Attention and concentration deficit  R41.840 799.51        Individual psychotherapy was provided utilizing CBT, SFT, and person-centered techniques to build rapport, encourage expression of thoughts and feelings, assess symptoms, and gather history. Allowed patient to freely discuss issues without interruption or judgement with unconditional positive regard, active listening skills, and empathy. Therapist utilized open-ended questions to encourage the development of a positive therapeutic relationship and open communication.  Therapist normalized/validated patient’s thoughts and feelings as appropriate. Patient rated depression at 5 and anxiety at 7 today on a 0-10 scale where 0= no symptoms.    Plan   Plan & Goals     Continue building rapport and exploring ways to manage inattention concerns.    Patient acknowledged and verbally consented to continue working toward resolving current treatment plan goals and was educated on the importance of participation in the therapeutic process.  Patient will remain compliant with medication regimen as prescribed. Discuss any medication side effects, questions or concerns with prescribing provider.  Call 911 or present to the nearest emergency room in an emergency situation.  National Suicide Prevention Lifeline: Call 988. The Lifeline provides 24/7, free and confidential support for  people in distress, prevention and crisis resources.  Crisis Text Line  Text HOME To 792637    Return in about 2 weeks (around 3/12/2024).    ____________________  This document has been electronically signed by Catalina Kothari LCSW  February 27, 2024 20:10 EST    Part of this note may be an electronic transcription/translation of spoken language to printed text using the Dragon Dictation System.

## 2024-02-27 ENCOUNTER — OFFICE VISIT (OUTPATIENT)
Dept: BEHAVIORAL HEALTH | Facility: CLINIC | Age: 24
End: 2024-02-27
Payer: COMMERCIAL

## 2024-02-27 DIAGNOSIS — F41.8 ANXIETY WITH DEPRESSION: Primary | ICD-10-CM

## 2024-02-27 DIAGNOSIS — R41.840 ATTENTION AND CONCENTRATION DEFICIT: ICD-10-CM

## 2024-02-29 ENCOUNTER — TELEPHONE (OUTPATIENT)
Dept: PSYCHIATRY | Facility: CLINIC | Age: 24
End: 2024-02-29
Payer: COMMERCIAL

## 2024-04-02 DIAGNOSIS — F33.1 MAJOR DEPRESSIVE DISORDER, RECURRENT EPISODE, MODERATE: ICD-10-CM

## 2024-04-02 DIAGNOSIS — F41.8 ANXIETY WITH DEPRESSION: ICD-10-CM

## 2024-04-02 DIAGNOSIS — F41.9 INSOMNIA SECONDARY TO ANXIETY: ICD-10-CM

## 2024-04-02 DIAGNOSIS — F51.05 INSOMNIA SECONDARY TO ANXIETY: ICD-10-CM

## 2024-04-02 DIAGNOSIS — F41.1 GENERALIZED ANXIETY DISORDER: Primary | ICD-10-CM

## 2024-04-03 RX ORDER — QUETIAPINE FUMARATE 25 MG/1
12.5 TABLET, FILM COATED ORAL
Qty: 45 TABLET | Refills: 0 | Status: SHIPPED | OUTPATIENT
Start: 2024-04-03 | End: 2024-07-02

## 2024-04-03 RX ORDER — BUSPIRONE HYDROCHLORIDE 5 MG/1
5 TABLET ORAL 2 TIMES DAILY
Qty: 180 TABLET | Refills: 0 | Status: SHIPPED | OUTPATIENT
Start: 2024-04-03

## 2024-04-03 RX ORDER — ESCITALOPRAM OXALATE 20 MG/1
20 TABLET ORAL DAILY
Qty: 90 TABLET | Refills: 0 | Status: SHIPPED | OUTPATIENT
Start: 2024-04-03 | End: 2024-07-02

## 2025-02-25 ENCOUNTER — TELEPHONE (OUTPATIENT)
Dept: PSYCHIATRY | Facility: CLINIC | Age: 25
End: 2025-02-25
Payer: COMMERCIAL

## 2025-02-25 NOTE — TELEPHONE ENCOUNTER
PATIENT WAS REFERRED OUT FOR NEUROPSYCH TESTING ON 2024, SEVERAL ATTEMPTS MADE TO FOLLOW UP ON REFERRAL ORDER, REFERRAL IS SET TO , CLOSING OUT REFERRAL ORDER.